# Patient Record
Sex: FEMALE | Race: WHITE | NOT HISPANIC OR LATINO
[De-identification: names, ages, dates, MRNs, and addresses within clinical notes are randomized per-mention and may not be internally consistent; named-entity substitution may affect disease eponyms.]

---

## 2017-06-23 ENCOUNTER — TRANSCRIPTION ENCOUNTER (OUTPATIENT)
Age: 52
End: 2017-06-23

## 2019-08-13 ENCOUNTER — TRANSCRIPTION ENCOUNTER (OUTPATIENT)
Age: 54
End: 2019-08-13

## 2023-01-19 ENCOUNTER — NON-APPOINTMENT (OUTPATIENT)
Age: 58
End: 2023-01-19

## 2023-01-20 ENCOUNTER — INPATIENT (INPATIENT)
Facility: HOSPITAL | Age: 58
LOS: 2 days | Discharge: HOME HEALTH | DRG: 872 | End: 2023-01-23
Attending: UROLOGY | Admitting: UROLOGY
Payer: COMMERCIAL

## 2023-01-20 VITALS
SYSTOLIC BLOOD PRESSURE: 171 MMHG | HEIGHT: 67 IN | HEART RATE: 126 BPM | WEIGHT: 199.08 LBS | DIASTOLIC BLOOD PRESSURE: 93 MMHG | OXYGEN SATURATION: 94 % | TEMPERATURE: 103 F | RESPIRATION RATE: 20 BRPM

## 2023-01-20 DIAGNOSIS — N12 TUBULO-INTERSTITIAL NEPHRITIS, NOT SPECIFIED AS ACUTE OR CHRONIC: ICD-10-CM

## 2023-01-20 DIAGNOSIS — N20.1 CALCULUS OF URETER: ICD-10-CM

## 2023-01-20 DIAGNOSIS — A41.9 SEPSIS, UNSPECIFIED ORGANISM: ICD-10-CM

## 2023-01-20 LAB
ALBUMIN SERPL ELPH-MCNC: 3.2 G/DL — LOW (ref 3.3–5)
ALP SERPL-CCNC: 375 U/L — HIGH (ref 40–120)
ALT FLD-CCNC: 31 U/L — SIGNIFICANT CHANGE UP (ref 10–45)
ANION GAP SERPL CALC-SCNC: 16 MMOL/L — SIGNIFICANT CHANGE UP (ref 5–17)
APPEARANCE UR: CLEAR — SIGNIFICANT CHANGE UP
APTT BLD: 30 SEC — SIGNIFICANT CHANGE UP (ref 27.5–35.5)
AST SERPL-CCNC: 31 U/L — SIGNIFICANT CHANGE UP (ref 10–40)
BASOPHILS # BLD AUTO: 0.03 K/UL — SIGNIFICANT CHANGE UP (ref 0–0.2)
BASOPHILS NFR BLD AUTO: 0.2 % — SIGNIFICANT CHANGE UP (ref 0–2)
BILIRUB SERPL-MCNC: 0.6 MG/DL — SIGNIFICANT CHANGE UP (ref 0.2–1.2)
BILIRUB UR-MCNC: NEGATIVE — SIGNIFICANT CHANGE UP
BLD GP AB SCN SERPL QL: NEGATIVE — SIGNIFICANT CHANGE UP
BUN SERPL-MCNC: 21 MG/DL — SIGNIFICANT CHANGE UP (ref 7–23)
CALCIUM SERPL-MCNC: 10.1 MG/DL — SIGNIFICANT CHANGE UP (ref 8.4–10.5)
CHLORIDE SERPL-SCNC: 99 MMOL/L — SIGNIFICANT CHANGE UP (ref 96–108)
CO2 SERPL-SCNC: 19 MMOL/L — LOW (ref 22–31)
COLOR SPEC: YELLOW — SIGNIFICANT CHANGE UP
CREAT SERPL-MCNC: 1.13 MG/DL — SIGNIFICANT CHANGE UP (ref 0.5–1.3)
DIFF PNL FLD: ABNORMAL
EGFR: 57 ML/MIN/1.73M2 — LOW
EOSINOPHIL # BLD AUTO: 0 K/UL — SIGNIFICANT CHANGE UP (ref 0–0.5)
EOSINOPHIL NFR BLD AUTO: 0 % — SIGNIFICANT CHANGE UP (ref 0–6)
GAS PNL BLDV: SIGNIFICANT CHANGE UP
GLUCOSE SERPL-MCNC: 132 MG/DL — HIGH (ref 70–99)
GLUCOSE UR QL: NEGATIVE — SIGNIFICANT CHANGE UP
HCG SERPL-ACNC: 3.8 MIU/ML — SIGNIFICANT CHANGE UP
HCT VFR BLD CALC: 31.7 % — LOW (ref 34.5–45)
HGB BLD-MCNC: 10.2 G/DL — LOW (ref 11.5–15.5)
IMM GRANULOCYTES NFR BLD AUTO: 1.1 % — HIGH (ref 0–0.9)
INR BLD: 1.34 RATIO — HIGH (ref 0.88–1.16)
KETONES UR-MCNC: NEGATIVE — SIGNIFICANT CHANGE UP
LEUKOCYTE ESTERASE UR-ACNC: NEGATIVE — SIGNIFICANT CHANGE UP
LYMPHOCYTES # BLD AUTO: 0.4 K/UL — LOW (ref 1–3.3)
LYMPHOCYTES # BLD AUTO: 3.2 % — LOW (ref 13–44)
MCHC RBC-ENTMCNC: 28.4 PG — SIGNIFICANT CHANGE UP (ref 27–34)
MCHC RBC-ENTMCNC: 32.2 GM/DL — SIGNIFICANT CHANGE UP (ref 32–36)
MCV RBC AUTO: 88.3 FL — SIGNIFICANT CHANGE UP (ref 80–100)
MONOCYTES # BLD AUTO: 0.11 K/UL — SIGNIFICANT CHANGE UP (ref 0–0.9)
MONOCYTES NFR BLD AUTO: 0.9 % — LOW (ref 2–14)
NEUTROPHILS # BLD AUTO: 11.8 K/UL — HIGH (ref 1.8–7.4)
NEUTROPHILS NFR BLD AUTO: 94.6 % — HIGH (ref 43–77)
NITRITE UR-MCNC: POSITIVE
NRBC # BLD: 0 /100 WBCS — SIGNIFICANT CHANGE UP (ref 0–0)
PH UR: 6 — SIGNIFICANT CHANGE UP (ref 5–8)
PLATELET # BLD AUTO: 176 K/UL — SIGNIFICANT CHANGE UP (ref 150–400)
POTASSIUM SERPL-MCNC: 4.8 MMOL/L — SIGNIFICANT CHANGE UP (ref 3.5–5.3)
POTASSIUM SERPL-SCNC: 4.8 MMOL/L — SIGNIFICANT CHANGE UP (ref 3.5–5.3)
PROT SERPL-MCNC: 7.1 G/DL — SIGNIFICANT CHANGE UP (ref 6–8.3)
PROT UR-MCNC: ABNORMAL
PROTHROM AB SERPL-ACNC: 15.4 SEC — HIGH (ref 10.5–13.4)
RAPID RVP RESULT: SIGNIFICANT CHANGE UP
RBC # BLD: 3.59 M/UL — LOW (ref 3.8–5.2)
RBC # FLD: 14.7 % — HIGH (ref 10.3–14.5)
RH IG SCN BLD-IMP: POSITIVE — SIGNIFICANT CHANGE UP
SARS-COV-2 RNA SPEC QL NAA+PROBE: SIGNIFICANT CHANGE UP
SODIUM SERPL-SCNC: 134 MMOL/L — LOW (ref 135–145)
SP GR SPEC: 1.04 — SIGNIFICANT CHANGE UP (ref 1.01–1.02)
TROPONIN T, HIGH SENSITIVITY RESULT: 12 NG/L — SIGNIFICANT CHANGE UP (ref 0–51)
UROBILINOGEN FLD QL: SIGNIFICANT CHANGE UP
WBC # BLD: 12.48 K/UL — HIGH (ref 3.8–10.5)
WBC # FLD AUTO: 12.48 K/UL — HIGH (ref 3.8–10.5)

## 2023-01-20 PROCEDURE — 74177 CT ABD & PELVIS W/CONTRAST: CPT | Mod: 26,MA

## 2023-01-20 PROCEDURE — 50432 PLMT NEPHROSTOMY CATHETER: CPT | Mod: RT

## 2023-01-20 PROCEDURE — 71045 X-RAY EXAM CHEST 1 VIEW: CPT | Mod: 26

## 2023-01-20 PROCEDURE — 99223 1ST HOSP IP/OBS HIGH 75: CPT

## 2023-01-20 PROCEDURE — 99291 CRITICAL CARE FIRST HOUR: CPT

## 2023-01-20 RX ORDER — ONDANSETRON 8 MG/1
4 TABLET, FILM COATED ORAL ONCE
Refills: 0 | Status: DISCONTINUED | OUTPATIENT
Start: 2023-01-20 | End: 2023-01-21

## 2023-01-20 RX ORDER — ACETAMINOPHEN 500 MG
975 TABLET ORAL EVERY 6 HOURS
Refills: 0 | Status: DISCONTINUED | OUTPATIENT
Start: 2023-01-20 | End: 2023-01-23

## 2023-01-20 RX ORDER — SODIUM CHLORIDE 9 MG/ML
1000 INJECTION, SOLUTION INTRAVENOUS
Refills: 0 | Status: DISCONTINUED | OUTPATIENT
Start: 2023-01-20 | End: 2023-01-21

## 2023-01-20 RX ORDER — HYDROMORPHONE HYDROCHLORIDE 2 MG/ML
0.5 INJECTION INTRAMUSCULAR; INTRAVENOUS; SUBCUTANEOUS
Refills: 0 | Status: DISCONTINUED | OUTPATIENT
Start: 2023-01-20 | End: 2023-01-21

## 2023-01-20 RX ORDER — OXYCODONE HYDROCHLORIDE 5 MG/1
5 TABLET ORAL ONCE
Refills: 0 | Status: DISCONTINUED | OUTPATIENT
Start: 2023-01-20 | End: 2023-01-21

## 2023-01-20 RX ORDER — HEPARIN SODIUM 5000 [USP'U]/ML
5000 INJECTION INTRAVENOUS; SUBCUTANEOUS EVERY 8 HOURS
Refills: 0 | Status: DISCONTINUED | OUTPATIENT
Start: 2023-01-20 | End: 2023-01-23

## 2023-01-20 RX ORDER — ACETAMINOPHEN 500 MG
975 TABLET ORAL ONCE
Refills: 0 | Status: COMPLETED | OUTPATIENT
Start: 2023-01-20 | End: 2023-01-20

## 2023-01-20 RX ORDER — CEFTRIAXONE 500 MG/1
1000 INJECTION, POWDER, FOR SOLUTION INTRAMUSCULAR; INTRAVENOUS ONCE
Refills: 0 | Status: COMPLETED | OUTPATIENT
Start: 2023-01-20 | End: 2023-01-20

## 2023-01-20 RX ORDER — SODIUM CHLORIDE 9 MG/ML
2800 INJECTION INTRAMUSCULAR; INTRAVENOUS; SUBCUTANEOUS ONCE
Refills: 0 | Status: COMPLETED | OUTPATIENT
Start: 2023-01-20 | End: 2023-01-20

## 2023-01-20 RX ORDER — HYDROMORPHONE HYDROCHLORIDE 2 MG/ML
0.25 INJECTION INTRAMUSCULAR; INTRAVENOUS; SUBCUTANEOUS
Refills: 0 | Status: DISCONTINUED | OUTPATIENT
Start: 2023-01-20 | End: 2023-01-21

## 2023-01-20 RX ORDER — OXYCODONE AND ACETAMINOPHEN 5; 325 MG/1; MG/1
2 TABLET ORAL EVERY 6 HOURS
Refills: 0 | Status: DISCONTINUED | OUTPATIENT
Start: 2023-01-20 | End: 2023-01-23

## 2023-01-20 RX ADMIN — CEFTRIAXONE 100 MILLIGRAM(S): 500 INJECTION, POWDER, FOR SOLUTION INTRAMUSCULAR; INTRAVENOUS at 18:42

## 2023-01-20 RX ADMIN — SODIUM CHLORIDE 2800 MILLILITER(S): 9 INJECTION INTRAMUSCULAR; INTRAVENOUS; SUBCUTANEOUS at 17:59

## 2023-01-20 RX ADMIN — SODIUM CHLORIDE 125 MILLILITER(S): 9 INJECTION, SOLUTION INTRAVENOUS at 23:54

## 2023-01-20 RX ADMIN — Medication 975 MILLIGRAM(S): at 17:59

## 2023-01-20 NOTE — H&P ADULT - NSHPPHYSICALEXAM_GEN_ALL_CORE
Physical Exam  Gen: NAD  HEENT: normocephalic, atraumatic, no scleral icterus  Pulm: CTA b/l, No respiratory distress, no subcostal retractions, no accessory muscle use   CV: S1 S2, RRR,  no JVD  Abd: Soft, NT, ND, no rebound tenderness or guarding  MSK:  mild R CVAT, Moving all extremities, full ROM in all extremities, No edema present  NEURO: A&Ox3, no focal neurological deficits, CN 2-12 grossly intact  SKIN: warm, dry, no rash.

## 2023-01-20 NOTE — PRE-ANESTHESIA EVALUATION ADULT - NSANTHOSAYNRD_GEN_A_CORE
No. KAYLEY screening performed.  STOP BANG Legend: 0-2 = LOW Risk; 3-4 = INTERMEDIATE Risk; 5-8 = HIGH Risk

## 2023-01-20 NOTE — PROCEDURE NOTE - NSPROCNAME_GEN_A_CORE
Signed up to see pt when shift started at 2. Pt had already eloped. Did not see pt.     Barry Renae,   01/03/18 8652     Interventional Radiology

## 2023-01-20 NOTE — PROCEDURE NOTE - PROCEDURE FINDINGS AND DETAILS
Status post successful placement of a right 8.5Fr nephrostomy tube. Approximately 100cc of purulent green tinged fluid/pus was aspirated during the procedure. Specimen sent for gram-stain and culture.  Drainage catheter left to gravity with drainage bag and sterile dressing applied.

## 2023-01-20 NOTE — ED PROVIDER NOTE - CARE PLAN
1 Principal Discharge DX:	Pyelonephritis  Secondary Diagnosis:	Stone, kidney  Secondary Diagnosis:	Severe sepsis

## 2023-01-20 NOTE — ED PROVIDER NOTE - CRITICAL CARE ATTENDING CONTRIBUTION TO CARE
Attending Statement: This was a shared visit with the BIBI. I reviewed and verified the documentation and independently performed the documented:     Medical Decision Making.    This is a 57-year-old female coming in with 10 days of fever and chills.  She is also got right-sided pain.  I reviewed the CT scan and there is a large stone in the proximal ureter with associated hydronephrosis.  I discussed the case with urology and they have come to see the patient.  I expect the patient's, required to go to the operating room placed nephrostomy tube.  IV fluid and antibiotics administered.

## 2023-01-20 NOTE — ED ADULT NURSE REASSESSMENT NOTE - NS ED NURSE REASSESS COMMENT FT1
Report received from KAYY Edwards. Pt A&Ox4. Does not appear to be in any acute distress. Pt reports generalized malaise. Urology at bedside. Pt admitted to surgery. Pending bed assignment. VS documented.

## 2023-01-20 NOTE — CONSULT NOTE ADULT - ASSESSMENT
-----------------------------------------------------------  Interventional Radiology Brief Consult Note  -----------------------------------------------------------    Reason for Referral: Right percutaneous nephrostomy tube placement     Clinical Summary: 57y Female with no significant pmh presents with complaint of fever x 10 days found to have an obstructing right UPJ/proximal ureter 1.6 cm stone with moderate/severe hydronephrosis, fevers and tachycardia. IR is consulted for right percutaneous nephrostomy tube placement.     Vitals:  T(F): 98.9 (23 @ 19:12), Max: 102.7 (23 @ 16:25)  HR: 79 (23 @ 19:12) (79 - 126)  BP: 113/56 (23 @ 19:12) (113/56 - 171/93)  RR: 17 (23 @ 19:12) (17 - 20)  SpO2: 95% (23 @ 19:12) (94% - 96%)    Labs:           10.2  12.48)-----(176     (23 @ 17:08)         31.7     134 | 99 | 21  --------------------< 132     (23 @ 17:08)  4.8 | 19 | 1.13       PT: 15.4<H> 23 @ 17:08  aPTT: 30.0 23 @ 17:08   INR: 1.34<H> 23 @ 17:08    Imagin.6 cm obstructing right proximal ureteral stone with moderate to severe hydronephrosis.     Assessment: 57y Female with an obstructing right proximal ureteral stone and moderate to severe hydronephrosis with 10 days of fevers found to be tachycardic and febrile on presentation.     Recommendations:  - Plan for right nephrostomy tube placement today. Given patient last ate/drank at 2pm, will proceed with procedure 8 hours after. Keep NPO.   - Please place IR procedure order under Dr. Bain.   - Updated cbc, bmp, coags and covid swab.        -----------------------------------------------------------  Interventional Radiology Brief Consult Note  -----------------------------------------------------------    Reason for Referral: Right percutaneous nephrostomy tube placement     Clinical Summary: 57y Female with no significant pmh presents with complaint of fever x 10 days found to have an obstructing right proximal ureter 1.6 cm stone with moderate/severe hydronephrosis, fevers and tachycardia. IR is consulted for right percutaneous nephrostomy tube placement.     Vitals:  T(F): 98.9 (23 @ 19:12), Max: 102.7 (23 @ 16:25)  HR: 79 (23 @ 19:12) (79 - 126)  BP: 113/56 (23 @ 19:12) (113/56 - 171/93)  RR: 17 (23 @ 19:12) (17 - 20)  SpO2: 95% (23 @ 19:12) (94% - 96%)    Labs:           10.2  12.48)-----(176     (23 @ 17:08)         31.7     134 | 99 | 21  --------------------< 132     (23 @ 17:08)  4.8 | 19 | 1.13       PT: 15.4<H> 23 @ 17:08  aPTT: 30.0 23 17:08   INR: 1.34<H> 23 @ 17:08    Imagin.6 cm obstructing right proximal ureteral stone with moderate to severe hydronephrosis.     Assessment: 57y Female with an obstructing right proximal ureteral stone and moderate to severe hydronephrosis with 10 days of fevers found to be tachycardic and febrile on presentation.     Recommendations:  - Plan for right nephrostomy tube placement today. Given patient last ate/drank at 2pm, will proceed with procedure 8 hours after. Keep NPO.   - Please place IR procedure order under Dr. Bain.   - Updated cbc, bmp, coags and covid swab.

## 2023-01-20 NOTE — H&P ADULT - PROBLEM SELECTOR PLAN 1
-Urgent IR consult for perc right nephrostomy tube   -Keep NPO    -Continue broad spectrum abx  -Follow up urine and blood cultures  -Monitor vitals and I&Os  -IV fluid hydration

## 2023-01-20 NOTE — ED ADULT NURSE NOTE - OBJECTIVE STATEMENT
58 y/o F A&Ox3 denies PMH/PSH presents to the ED from home c/o fever. Pt reports intermittent fevers and chills x10 days. Pt states she feels like her entire body is rigoring. Pt endorses right sided back pain and right sided abdominal pain. Upon ED arrival pt is well appearing. Breathing is even and unlabored. Skin is warm, dry & in tact. EKG completed at bedside, placed on CM- sinus tachycardia 110s. Pt febrile, 101.1 orally. Pt denies CP, SOB, dizziness, N/V/D, numbness, tingling, cough, urinary s/s. IV access obtained. Call bell within reach, comfort & safety provided. 56 y/o F A&Ox3 denies PMH/PSH presents to the ED from home c/o fever. Pt reports intermittent fevers and chills x10 days. Pt states she feels like her entire body is rigoring. Pt endorses right sided back pain and right sided abdominal pain. Upon ED arrival pt is well appearing. Breathing is even and unlabored. Skin is warm, dry & in tact. EKG completed at bedside, placed on CM- sinus tachycardia 110s. Pt febrile, 101.1 orally. Pt endorses slight right sided CVA tenderness on palpation. Pt denies CP, SOB, dizziness, N/V/D, numbness, tingling, cough, urinary s/s. IV access obtained. Call bell within reach, comfort & safety provided.

## 2023-01-20 NOTE — H&P ADULT - ATTENDING COMMENTS
CT imaging reviewed.  Large obstructing stone at the right UPJ/proximal ureter.  Sepsis secondary to UTI secondary to obstruction stone.  Emergent IR NT placement.

## 2023-01-20 NOTE — H&P ADULT - HISTORY OF PRESENT ILLNESS
56y/o female with no significant PMHx presents to ED c/o fever and rigors x 10 days. Admits to general malaise and dull nonradiating right flank pain. She reports urinary frequency but no dysuria, urgency, gross hematuria or abdominal pain. Pt reports she was unable to seek medical attention sooner due to family matters but rigors and weakness worsened today.

## 2023-01-20 NOTE — ED PROVIDER NOTE - OBJECTIVE STATEMENT
57-year-old female no significant past medical history coming in with complaint of fever x10 days..  Patient states that she had a busy week so was not able to attend to it and come in sooner but she has been having) type of chest pain over the last 10 days that is very very painful like, positive malaise positive rhinovirus positive right lower quadrant abdominal pain and mild/discomfort increased thirst, right side mild back pain. never had in past. +urinary frequency but does state she normally urinates a lot. no dysuria.  Patient 57-year-old female no significant past medical history coming in with complaint of fever x10 days..  Patient states that she had a busy week so was not able to attend to it and come in sooner but she has been having rigor chills with fevers intermittent for  over the last 10 days that is very painful, +malaise positive rhinorrhea, positive right lower quadrant abdominal pain that is mild/discomfort, increased thirst, right side mild back pain. never had in past. +urinary frequency but does state she normally urinates a lot. no dysuria.    denies dysuria, denies cough, sore throat, chest pain

## 2023-01-20 NOTE — PATIENT PROFILE ADULT - FALL HARM RISK - UNIVERSAL INTERVENTIONS
Bed in lowest position, wheels locked, appropriate side rails in place/Call bell, personal items and telephone in reach/Instruct patient to call for assistance before getting out of bed or chair/Non-slip footwear when patient is out of bed/[place] to call system/Physically safe environment - no spills, clutter or unnecessary equipment/Purposeful Proactive Rounding/Room/bathroom lighting operational, light cord in reach

## 2023-01-20 NOTE — PRE-ANESTHESIA EVALUATION ADULT - NSANTHPMHFT_GEN_ALL_CORE
56 y/o female, with no significant PMH; c/o fever, rigor x 10 days; found to have right hydronephrosis and nephrolithiasis; WBC 12.48, creat 1.13; lactate 2.9

## 2023-01-20 NOTE — H&P ADULT - NSHPLABSRESULTS_GEN_ALL_CORE
10.2   12.48 )-----------( 176      ( 20 Jan 2023 17:08 )             31.7     01-20    134<L>  |  99  |  21  ----------------------------<  132<H>  4.8   |  19<L>  |  1.13    Ca    10.1      20 Jan 2023 17:08    TPro  7.1  /  Alb  3.2<L>  /  TBili  0.6  /  DBili  x   /  AST  31  /  ALT  31  /  AlkPhos  375<H>  01-20    < from: CT Abdomen and Pelvis w/ IV Cont (01.20.23 @ 17:53) >    IMPRESSION:  1.6 cm obstructing right proximal ureteral calculus with resultant   moderate to severe hydronephrosis.    Right perinephric and periureteral inflammation with multiple right   hypodense intrarenal collections closely associated with the collecting   system, suggestive of possible superimposed pyonephrosis/pyelonephritis.   Recommend correlation with urinalysis and serum laboratory findings.    < end of copied text >

## 2023-01-20 NOTE — H&P ADULT - ASSESSMENT
56y/o female with sepsis secondary to a 1.6cm right proximal stone with associated moderate to severe hydronephrosis

## 2023-01-21 LAB
ANION GAP SERPL CALC-SCNC: 11 MMOL/L — SIGNIFICANT CHANGE UP (ref 5–17)
BUN SERPL-MCNC: 18 MG/DL — SIGNIFICANT CHANGE UP (ref 7–23)
CALCIUM SERPL-MCNC: 8.9 MG/DL — SIGNIFICANT CHANGE UP (ref 8.4–10.5)
CHLORIDE SERPL-SCNC: 105 MMOL/L — SIGNIFICANT CHANGE UP (ref 96–108)
CO2 SERPL-SCNC: 21 MMOL/L — LOW (ref 22–31)
CREAT SERPL-MCNC: 0.99 MG/DL — SIGNIFICANT CHANGE UP (ref 0.5–1.3)
E COLI DNA BLD POS QL NAA+NON-PROBE: SIGNIFICANT CHANGE UP
EGFR: 67 ML/MIN/1.73M2 — SIGNIFICANT CHANGE UP
GLUCOSE SERPL-MCNC: 111 MG/DL — HIGH (ref 70–99)
GRAM STN FLD: SIGNIFICANT CHANGE UP
HCT VFR BLD CALC: 25.9 % — LOW (ref 34.5–45)
HGB BLD-MCNC: 8.2 G/DL — LOW (ref 11.5–15.5)
MCHC RBC-ENTMCNC: 28.3 PG — SIGNIFICANT CHANGE UP (ref 27–34)
MCHC RBC-ENTMCNC: 31.7 GM/DL — LOW (ref 32–36)
MCV RBC AUTO: 89.3 FL — SIGNIFICANT CHANGE UP (ref 80–100)
METHOD TYPE: SIGNIFICANT CHANGE UP
NRBC # BLD: 0 /100 WBCS — SIGNIFICANT CHANGE UP (ref 0–0)
PLATELET # BLD AUTO: 116 K/UL — LOW (ref 150–400)
POTASSIUM SERPL-MCNC: 4.4 MMOL/L — SIGNIFICANT CHANGE UP (ref 3.5–5.3)
POTASSIUM SERPL-SCNC: 4.4 MMOL/L — SIGNIFICANT CHANGE UP (ref 3.5–5.3)
RBC # BLD: 2.9 M/UL — LOW (ref 3.8–5.2)
RBC # FLD: 15 % — HIGH (ref 10.3–14.5)
SODIUM SERPL-SCNC: 137 MMOL/L — SIGNIFICANT CHANGE UP (ref 135–145)
SPECIMEN SOURCE: SIGNIFICANT CHANGE UP
SPECIMEN SOURCE: SIGNIFICANT CHANGE UP
WBC # BLD: 10.91 K/UL — HIGH (ref 3.8–10.5)
WBC # FLD AUTO: 10.91 K/UL — HIGH (ref 3.8–10.5)

## 2023-01-21 PROCEDURE — 99231 SBSQ HOSP IP/OBS SF/LOW 25: CPT

## 2023-01-21 RX ORDER — PIPERACILLIN AND TAZOBACTAM 4; .5 G/20ML; G/20ML
3.38 INJECTION, POWDER, LYOPHILIZED, FOR SOLUTION INTRAVENOUS EVERY 8 HOURS
Refills: 0 | Status: DISCONTINUED | OUTPATIENT
Start: 2023-01-21 | End: 2023-01-23

## 2023-01-21 RX ORDER — PIPERACILLIN AND TAZOBACTAM 4; .5 G/20ML; G/20ML
3.38 INJECTION, POWDER, LYOPHILIZED, FOR SOLUTION INTRAVENOUS ONCE
Refills: 0 | Status: COMPLETED | OUTPATIENT
Start: 2023-01-21 | End: 2023-01-21

## 2023-01-21 RX ORDER — SENNA PLUS 8.6 MG/1
2 TABLET ORAL AT BEDTIME
Refills: 0 | Status: DISCONTINUED | OUTPATIENT
Start: 2023-01-21 | End: 2023-01-23

## 2023-01-21 RX ORDER — SODIUM CHLORIDE 9 MG/ML
1000 INJECTION, SOLUTION INTRAVENOUS
Refills: 0 | Status: DISCONTINUED | OUTPATIENT
Start: 2023-01-21 | End: 2023-01-23

## 2023-01-21 RX ADMIN — OXYCODONE HYDROCHLORIDE 5 MILLIGRAM(S): 5 TABLET ORAL at 00:57

## 2023-01-21 RX ADMIN — PIPERACILLIN AND TAZOBACTAM 25 GRAM(S): 4; .5 INJECTION, POWDER, LYOPHILIZED, FOR SOLUTION INTRAVENOUS at 09:13

## 2023-01-21 RX ADMIN — PIPERACILLIN AND TAZOBACTAM 25 GRAM(S): 4; .5 INJECTION, POWDER, LYOPHILIZED, FOR SOLUTION INTRAVENOUS at 22:14

## 2023-01-21 RX ADMIN — OXYCODONE HYDROCHLORIDE 5 MILLIGRAM(S): 5 TABLET ORAL at 01:50

## 2023-01-21 RX ADMIN — Medication 975 MILLIGRAM(S): at 20:39

## 2023-01-21 RX ADMIN — Medication 975 MILLIGRAM(S): at 13:41

## 2023-01-21 RX ADMIN — SODIUM CHLORIDE 125 MILLILITER(S): 9 INJECTION, SOLUTION INTRAVENOUS at 05:52

## 2023-01-21 RX ADMIN — HEPARIN SODIUM 5000 UNIT(S): 5000 INJECTION INTRAVENOUS; SUBCUTANEOUS at 22:14

## 2023-01-21 RX ADMIN — SENNA PLUS 2 TABLET(S): 8.6 TABLET ORAL at 22:45

## 2023-01-21 RX ADMIN — HEPARIN SODIUM 5000 UNIT(S): 5000 INJECTION INTRAVENOUS; SUBCUTANEOUS at 13:42

## 2023-01-21 RX ADMIN — Medication 975 MILLIGRAM(S): at 14:11

## 2023-01-21 RX ADMIN — PIPERACILLIN AND TAZOBACTAM 25 GRAM(S): 4; .5 INJECTION, POWDER, LYOPHILIZED, FOR SOLUTION INTRAVENOUS at 15:03

## 2023-01-21 RX ADMIN — PIPERACILLIN AND TAZOBACTAM 200 GRAM(S): 4; .5 INJECTION, POWDER, LYOPHILIZED, FOR SOLUTION INTRAVENOUS at 05:51

## 2023-01-21 RX ADMIN — HEPARIN SODIUM 5000 UNIT(S): 5000 INJECTION INTRAVENOUS; SUBCUTANEOUS at 05:51

## 2023-01-21 RX ADMIN — Medication 975 MILLIGRAM(S): at 21:15

## 2023-01-21 NOTE — PROGRESS NOTE ADULT - SUBJECTIVE AND OBJECTIVE BOX
UROLOGY PA PROGRESS NOTE:     Subjective:  no acute events overnight, s/p right NT last night, no acute events since.     Objective:  Vital signs  T(F): , Max: 102.7 (23 @ 16:25)  HR: 64 (23 @ 05:12)  BP: 103/64 (23 @ 05:12)  SpO2: 95% (23 @ 08:16)  Wt(kg): --    Output      @ 07:  -   @ 07:00  --------------------------------------------------------  IN: 875 mL / OUT: 100 mL / NET: 775 mL     @ 07:01  -   @ 09:26  --------------------------------------------------------  IN: 0 mL / OUT: 500 mL / NET: -500 mL    NT 100cc overnight     Physical Exam:  Gen: NAD  Abd: soft, NT/ND, +R NT draining ligh tpink urine   : voiding     Labs:    10.91 / 25.9  /x        x     / x     /0.99                           8.2    10. )-----------( 116      ( 2023 07:06 )             25.9         137  |  105  |  18  ----------------------------<  111<H>  4.4   |  21<L>  |  0.99    Ca    8.9      2023 07:05    TPro  7.1  /  Alb  3.2<L>  /  TBili  0.6  /  DBili  x   /  AST  31  /  ALT  31  /  AlkPhos  375<H>      PT/INR - ( 2023 17:08 )   PT: 15.4 sec;   INR: 1.34 ratio         PTT - ( 2023 17:08 )  PTT:30.0 sec  Urinalysis Basic - ( 2023 18:51 )    Color: Yellow / Appearance: Clear / S.043 / pH: x  Gluc: x / Ketone: Negative  / Bili: Negative / Urobili: <2 mg/dL   Blood: x / Protein: 100 mg/dL / Nitrite: Positive   Leuk Esterase: Negative / RBC: See Note /hpf / WBC See Note   Sq Epi: x / Non Sq Epi: x / Bacteria: x        Urine Cx:    Culture - Blood (collected 2023 17:15)  Source: .Blood Blood  Gram Stain (2023 08:31):    Growth in anaerobic bottle: Gram Negative Rods    Growth in aerobic bottle: Gram Negative Rods  Preliminary Report (2023 08:31):    Growth in anaerobic bottle: Gram Negative Rods    Growth in aerobic bottle: Gram Negative Rods    ***Blood Panel PCR results on this specimen are available    approximately 3 hours after the Gram stain result.***    Gram stain, PCR, and/or culture results may not always    correspond due to difference in methodologies.    ************************************************************    This PCR assay was performed by multiplex PCR. This    Assay tests for 66 bacterial and resistance gene targets.    Please refer to the [facility] test directory    at [email or web address] for details.  Organism: Blood Culture PCR (2023 07:09)  Organism: Blood Culture PCR (2023 07:09)

## 2023-01-21 NOTE — PROGRESS NOTE ADULT - SUBJECTIVE AND OBJECTIVE BOX
-------------------------------------------------------  Interventional Radiology Follow-Up Note  -------------------------------------------------------    Procedure: Right nephrostomy tube placement     57 y.o. F with sepsis secondary to 1.6 cm obstructing right proximal ureter stone with moderate/severe hydronephrosis s/p right nephrostomy tube placement on 1/20/23.   Patient was seen bedside with no complaints. No significant events over night.     Vital Signs:    T(C): 36.4 (01-21-23 @ 08:16), Max: 39.3 (01-20-23 @ 16:25)  HR: 64 (01-21-23 @ 05:12) (62 - 126)  BP: 103/64 (01-21-23 @ 05:12) (96/55 - 171/93)  RR: 18 (01-21-23 @ 08:16) (14 - 20)  SpO2: 95% (01-21-23 @ 08:16) (94% - 96%)    Labs:           8.2  10.91)-----(116     (01-21-23 @ 07:06)         25.9     137 | 105 | 18  --------------------< 111     (01-21-23 @ 07:05)  4.4 | 21 | 0.99       PT: 15.4<H> 01-20-23 @ 17:08  aPTT: 30.0 01-20-23 @ 17:08   INR: 1.34<H> 01-20-23 @ 17:08      Physical Exam:     General: No acute distress.   Cardiovascular: No tachycardia  Respiratory: Breathing comfortably.   Abdomen: Non-distended. No tenderness to palpation.   Extremities: No edema. No groin hematoma. Pulses 2+     Impression:  57 y.o. F with sepsis secondary to an obstructing 1.6 cm proximal right ureter stone with moderate/severe hydronephrosis now s/p right nephrostomy tube placement on 1/20/23.    Recommendations:  - Monitor for signs of sepsis and bleeding.   - Continue with drainage bag.   - Continue with abx.   - Rest of care per primary service.     --  Rupal Srinivasan MD   Interventional Radiology Resident (PGY-4)  Available on [employer] TEAMS    For EMERGENT inquiries/questions:  IR Pager ([facility]): [phone]  IR Pager ([facility]): [phone] ; [phone]    For non-emergent consults/questions:   Please place a sunrise order "Consult- Interventional Radiology" with an appropriate callback number    For questions about scheduling during appropriate work hours, call IR :  [facility]: [phone]  [facility]: [phone]    For outpatient IR booking:  [facility]: [phone]  [facility]: [phone]   -------------------------------------------------------  Interventional Radiology Follow-Up Note  -------------------------------------------------------    Procedure: Right nephrostomy tube placement     57 y.o. F with sepsis secondary to 1.6 cm obstructing right proximal ureter stone with moderate/severe hydronephrosis s/p right nephrostomy tube placement on 1/20/23.   Patient was seen bedside with no complaints. No significant events over night.     Vital Signs:    T(C): 36.4 (01-21-23 @ 08:16), Max: 39.3 (01-20-23 @ 16:25)  HR: 64 (01-21-23 @ 05:12) (62 - 126)  BP: 103/64 (01-21-23 @ 05:12) (96/55 - 171/93)  RR: 18 (01-21-23 @ 08:16) (14 - 20)  SpO2: 95% (01-21-23 @ 08:16) (94% - 96%)    Labs:           8.2  10.91)-----(116     (01-21-23 @ 07:06)         25.9     137 | 105 | 18  --------------------< 111     (01-21-23 @ 07:05)  4.4 | 21 | 0.99       PT: 15.4<H> 01-20-23 @ 17:08  aPTT: 30.0 01-20-23 @ 17:08   INR: 1.34<H> 01-20-23 @ 17:08      Physical Exam:     General: No acute distress.   Respiratory: Breathing comfortably.   Abdomen: Non-distended. No tenderness to palpation. Right nephrostomy tube was just emptied with 5cc blood tinged urine in the drainage bag.     Impression:  57 y.o. F with sepsis secondary to an obstructing 1.6 cm proximal right ureter stone with moderate/severe hydronephrosis now s/p right nephrostomy tube placement on 1/20/23.    Recommendations:  - Monitor for signs of sepsis and bleeding.   - Continue with monitoring drainage output.  - Continue with abx.   - Rest of care per primary service.     --  Rupal Srinivasan MD   Interventional Radiology Resident (PGY-4)  Available on [employer] TEAMS    For EMERGENT inquiries/questions:  IR Pager ([facility]): [phone]  IR Pager ([facility]): [phone] ; [phone]    For non-emergent consults/questions:   Please place a sunrise order "Consult- Interventional Radiology" with an appropriate callback number    For questions about scheduling during appropriate work hours, call IR :  [facility]: [phone]  [facility]: [phone]    For outpatient IR booking:  [facility]: [phone]  [facility]: [phone]

## 2023-01-22 LAB
-  AMIKACIN: SIGNIFICANT CHANGE UP
-  AMPICILLIN/SULBACTAM: SIGNIFICANT CHANGE UP
-  AMPICILLIN: SIGNIFICANT CHANGE UP
-  AZTREONAM: SIGNIFICANT CHANGE UP
-  CEFAZOLIN: SIGNIFICANT CHANGE UP
-  CEFEPIME: SIGNIFICANT CHANGE UP
-  CEFOXITIN: SIGNIFICANT CHANGE UP
-  CEFTRIAXONE: SIGNIFICANT CHANGE UP
-  CIPROFLOXACIN: SIGNIFICANT CHANGE UP
-  ERTAPENEM: SIGNIFICANT CHANGE UP
-  GENTAMICIN: SIGNIFICANT CHANGE UP
-  IMIPENEM: SIGNIFICANT CHANGE UP
-  LEVOFLOXACIN: SIGNIFICANT CHANGE UP
-  MEROPENEM: SIGNIFICANT CHANGE UP
-  PIPERACILLIN/TAZOBACTAM: SIGNIFICANT CHANGE UP
-  TOBRAMYCIN: SIGNIFICANT CHANGE UP
-  TRIMETHOPRIM/SULFAMETHOXAZOLE: SIGNIFICANT CHANGE UP
ANION GAP SERPL CALC-SCNC: 10 MMOL/L — SIGNIFICANT CHANGE UP (ref 5–17)
BUN SERPL-MCNC: 16 MG/DL — SIGNIFICANT CHANGE UP (ref 7–23)
CALCIUM SERPL-MCNC: 9.1 MG/DL — SIGNIFICANT CHANGE UP (ref 8.4–10.5)
CHLORIDE SERPL-SCNC: 105 MMOL/L — SIGNIFICANT CHANGE UP (ref 96–108)
CO2 SERPL-SCNC: 21 MMOL/L — LOW (ref 22–31)
CREAT SERPL-MCNC: 1.1 MG/DL — SIGNIFICANT CHANGE UP (ref 0.5–1.3)
CULTURE RESULTS: SIGNIFICANT CHANGE UP
EGFR: 59 ML/MIN/1.73M2 — LOW
GLUCOSE SERPL-MCNC: 146 MG/DL — HIGH (ref 70–99)
GRAM STN FLD: SIGNIFICANT CHANGE UP
HCT VFR BLD CALC: 25.4 % — LOW (ref 34.5–45)
HGB BLD-MCNC: 7.9 G/DL — LOW (ref 11.5–15.5)
MCHC RBC-ENTMCNC: 28 PG — SIGNIFICANT CHANGE UP (ref 27–34)
MCHC RBC-ENTMCNC: 31.1 GM/DL — LOW (ref 32–36)
MCV RBC AUTO: 90.1 FL — SIGNIFICANT CHANGE UP (ref 80–100)
METHOD TYPE: SIGNIFICANT CHANGE UP
NRBC # BLD: 0 /100 WBCS — SIGNIFICANT CHANGE UP (ref 0–0)
PLATELET # BLD AUTO: 156 K/UL — SIGNIFICANT CHANGE UP (ref 150–400)
POTASSIUM SERPL-MCNC: 4.3 MMOL/L — SIGNIFICANT CHANGE UP (ref 3.5–5.3)
POTASSIUM SERPL-SCNC: 4.3 MMOL/L — SIGNIFICANT CHANGE UP (ref 3.5–5.3)
RBC # BLD: 2.82 M/UL — LOW (ref 3.8–5.2)
RBC # FLD: 15.1 % — HIGH (ref 10.3–14.5)
SODIUM SERPL-SCNC: 136 MMOL/L — SIGNIFICANT CHANGE UP (ref 135–145)
SPECIMEN SOURCE: SIGNIFICANT CHANGE UP
WBC # BLD: 11.32 K/UL — HIGH (ref 3.8–10.5)
WBC # FLD AUTO: 11.32 K/UL — HIGH (ref 3.8–10.5)

## 2023-01-22 PROCEDURE — 99231 SBSQ HOSP IP/OBS SF/LOW 25: CPT

## 2023-01-22 RX ADMIN — SENNA PLUS 2 TABLET(S): 8.6 TABLET ORAL at 21:00

## 2023-01-22 RX ADMIN — Medication 975 MILLIGRAM(S): at 21:35

## 2023-01-22 RX ADMIN — HEPARIN SODIUM 5000 UNIT(S): 5000 INJECTION INTRAVENOUS; SUBCUTANEOUS at 21:00

## 2023-01-22 RX ADMIN — Medication 975 MILLIGRAM(S): at 21:01

## 2023-01-22 RX ADMIN — HEPARIN SODIUM 5000 UNIT(S): 5000 INJECTION INTRAVENOUS; SUBCUTANEOUS at 14:15

## 2023-01-22 RX ADMIN — PIPERACILLIN AND TAZOBACTAM 25 GRAM(S): 4; .5 INJECTION, POWDER, LYOPHILIZED, FOR SOLUTION INTRAVENOUS at 14:15

## 2023-01-22 RX ADMIN — SODIUM CHLORIDE 75 MILLILITER(S): 9 INJECTION, SOLUTION INTRAVENOUS at 17:01

## 2023-01-22 RX ADMIN — Medication 975 MILLIGRAM(S): at 03:50

## 2023-01-22 RX ADMIN — Medication 975 MILLIGRAM(S): at 10:29

## 2023-01-22 RX ADMIN — HEPARIN SODIUM 5000 UNIT(S): 5000 INJECTION INTRAVENOUS; SUBCUTANEOUS at 05:08

## 2023-01-22 RX ADMIN — PIPERACILLIN AND TAZOBACTAM 25 GRAM(S): 4; .5 INJECTION, POWDER, LYOPHILIZED, FOR SOLUTION INTRAVENOUS at 05:08

## 2023-01-22 RX ADMIN — Medication 975 MILLIGRAM(S): at 11:29

## 2023-01-22 RX ADMIN — Medication 975 MILLIGRAM(S): at 04:23

## 2023-01-22 RX ADMIN — PIPERACILLIN AND TAZOBACTAM 25 GRAM(S): 4; .5 INJECTION, POWDER, LYOPHILIZED, FOR SOLUTION INTRAVENOUS at 21:01

## 2023-01-22 NOTE — PROVIDER CONTACT NOTE (CRITICAL VALUE NOTIFICATION) - BACKGROUND
Pt s/p right percutaneous nephrostomy placement.
Pt admitted for tubulointerstitial nephritis s/p R neph tube placement
admitted for fevers, rigors, neph tube placed for hydronephrosis
pt currently admitted for fevers and R flank pain, s/p R NT placement

## 2023-01-22 NOTE — PROGRESS NOTE ADULT - SUBJECTIVE AND OBJECTIVE BOX
-------------------------------------------------------  Interventional Radiology Follow-Up Note  -------------------------------------------------------    Procedure: Right nephrostomy tube placement     57 y.o. F with sepsis secondary to 1.6 cm obstructing right proximal ureter stone with moderate/severe hydronephrosis s/p right nephrostomy tube placement on 1/20/23. No significant events over night. Reports drainage has become clear. Reports mild abdominal pain. Encouraged ambulation.     Vital Signs:    T(C): 36.4 (01-22-23 @ 09:14), Max: 36.7 (01-21-23 @ 16:33)  HR: 63 (01-22-23 @ 09:14) (57 - 68)  BP: 129/76 (01-22-23 @ 09:14) (116/70 - 129/76)  RR: 18 (01-22-23 @ 09:14) (18 - 18)  SpO2: 95% (01-22-23 @ 09:14) (95% - 97%)    Labs:           7.9  11.32)-----(156     (01-22-23 @ 07:27)         25.4     136 | 105 | 16  --------------------< 146     (01-22-23 @ 07:27)  4.3 | 21 | 1.10       PT: 15.4<H> 01-20-23 @ 17:08  aPTT: 30.0 01-20-23 @ 17:08   INR: 1.34<H> 01-20-23 @ 17:08      Physical Exam:     General: No acute distress.   Respiratory: Breathing comfortably.   Abdomen: Non-distended. No tenderness to palpation. Right nephrostomy tube with clear urine in the drainage bag.       Impression:  57 y.o. F with sepsis secondary to an obstructing 1.6 cm proximal right ureter stone with moderate/severe hydronephrosis now s/p right nephrostomy tube placement on 1/20/23.    Recommendations:  - Monitor for signs of sepsis and bleeding. F/u repeat blood cultures.   - Continue with monitoring drainage output.  - Continue with abx.   - Rest of care per primary service and urology.       --  Rupal Srinivasan MD   Interventional Radiology Resident (PGY-4)  Available on [employer] TEAMS    For EMERGENT inquiries/questions:  IR Pager ([facility]): [phone]  IR Pager ([facility]): [phone] ; [phone]    For non-emergent consults/questions:   Please place a sunrise order "Consult- Interventional Radiology" with an appropriate callback number    For questions about scheduling during appropriate work hours, call IR :  [facility]: [phone]  [facility]: [phone]    For outpatient IR booking:  [facility]: [phone]  [facility]: [phone]

## 2023-01-22 NOTE — CONSULT NOTE ADULT - SUBJECTIVE AND OBJECTIVE BOX
HPI:  56y/o female with no significant PMHx presents to ED c/o fever and rigors x 10 days. Admits to general malaise and dull nonradiating right flank pain. She reports urinary frequency but no dysuria, urgency, gross hematuria or abdominal pain. Pt reports she was unable to seek medical attention sooner due to family matters but rigors and weakness worsened today. (20 Jan 2023 19:17)      PAST MEDICAL & SURGICAL HISTORY:  No pertinent past medical history          Review of Systems:   CONSTITUTIONAL: No fever, weight loss, or fatigue  EYES: No eye pain, visual disturbances, or discharge  ENMT:  No difficulty hearing, tinnitus, vertigo; No sinus or throat pain  NECK: No pain or stiffness  BREASTS: No pain, masses, or nipple discharge  RESPIRATORY: No cough, wheezing, chills or hemoptysis; No shortness of breath  CARDIOVASCULAR: No chest pain, palpitations, dizziness, or leg swelling  GASTROINTESTINAL: No abdominal or epigastric pain. No nausea, vomiting, or hematemesis; No diarrhea or constipation. No melena or hematochezia.  GENITOURINARY: No dysuria, frequency, hematuria, or incontinence  NEUROLOGICAL: No headaches, memory loss, loss of strength, numbness, or tremors  SKIN: No itching, burning, rashes, or lesions   LYMPH NODES: No enlarged glands  ENDOCRINE: No heat or cold intolerance; No hair loss  MUSCULOSKELETAL: No joint pain or swelling; No muscle, back, or extremity pain  PSYCHIATRIC: No depression, anxiety, mood swings, or difficulty sleeping  HEME/LYMPH: No easy bruising, or bleeding gums  ALLERY AND IMMUNOLOGIC: No hives or eczema    Allergies    No Known Allergies    Intolerances        Social History:     FAMILY HISTORY:      MEDICATIONS  (STANDING):  heparin   Injectable 5000 Unit(s) SubCutaneous every 8 hours  piperacillin/tazobactam IVPB.. 3.375 Gram(s) IV Intermittent every 8 hours  senna 2 Tablet(s) Oral at bedtime    MEDICATIONS  (PRN):  acetaminophen     Tablet .. 975 milliGRAM(s) Oral every 6 hours PRN Mild Pain (1 - 3)  oxycodone    5 mG/acetaminophen 325 mG 1 Tablet(s) Oral every 4 hours PRN Moderate Pain (4 - 6)  oxycodone    5 mG/acetaminophen 325 mG 2 Tablet(s) Oral every 6 hours PRN Severe Pain (7 - 10)        CAPILLARY BLOOD GLUCOSE        I&O's Summary    22 Jan 2023 07:01  -  23 Jan 2023 07:00  --------------------------------------------------------  IN: 3110 mL / OUT: 1800 mL / NET: 1310 mL    23 Jan 2023 07:01  -  23 Jan 2023 11:08  --------------------------------------------------------  IN: 0 mL / OUT: 200 mL / NET: -200 mL        PHYSICAL EXAM:  GENERAL: NAD, well-developed  HEAD:  Atraumatic, Normocephalic  EYES: EOMI, PERRLA, conjunctiva and sclera clear  NECK: Supple, No JVD  CHEST/LUNG: Clear to auscultation bilaterally; No wheeze  HEART: Regular rate and rhythm; No murmurs, rubs, or gallops  ABDOMEN: Soft, Nontender, Nondistended; Bowel sounds present  EXTREMITIES:  2+ Peripheral Pulses, No clubbing, cyanosis, or edema  PSYCH: AAOx3  NEUROLOGY: non-focal  SKIN: No rashes or lesions    LABS:                        8.3    10.89 )-----------( 174      ( 23 Jan 2023 07:26 )             26.4     01-23    140  |  105  |  12  ----------------------------<  140<H>  4.2   |  23  |  0.91    Ca    8.9      23 Jan 2023 07:25                RADIOLOGY & ADDITIONAL TESTS:    Imaging Personally Reviewed:    Consultant(s) Notes Reviewed:      Care Discussed with Consultants/Other Providers:

## 2023-01-22 NOTE — PROGRESS NOTE ADULT - SUBJECTIVE AND OBJECTIVE BOX
UROLOGY PA PROGRESS NOTE:     Subjective:  no acute events overnight, feeling sore at right flank around NT , no fever/chills     Objective:  Vital signs  T(F): , Max: 98.1 (23 @ 16:33)  HR: 63 (23 @ 09:14)  BP: 129/76 (23 @ 09:14)  SpO2: 95% (23 @ 09:14)  Wt(kg): --    Output      @ 07:01  -   @ 07:00  --------------------------------------------------------  IN: 3480 mL / OUT: 3025 mL / NET: 455 mL    void 950cc  NT 475cc    Physical Exam:  Gen: NAD  Abd: soft, NT/ND, right NT draining clear yellow urine  : voiding     Labs:    11.32 / 25.4  /1.10     10.91 / 25.9  /x                              7.9    11.32 )-----------( 156      ( 2023 07:27 )             25.4         136  |  105  |  16  ----------------------------<  146<H>  4.3   |  21<L>  |  1.10    Ca    9.1      2023 07:27    TPro  7.1  /  Alb  3.2<L>  /  TBili  0.6  /  DBili  x   /  AST  31  /  ALT  31  /  AlkPhos  375<H>      PT/INR - ( 2023 17:08 )   PT: 15.4 sec;   INR: 1.34 ratio         PTT - ( 2023 17:08 )  PTT:30.0 sec  Urinalysis Basic - ( 2023 18:51 )    Color: Yellow / Appearance: Clear / S.043 / pH: x  Gluc: x / Ketone: Negative  / Bili: Negative / Urobili: <2 mg/dL   Blood: x / Protein: 100 mg/dL / Nitrite: Positive   Leuk Esterase: Negative / RBC: See Note /hpf / WBC See Note   Sq Epi: x / Non Sq Epi: x / Bacteria: x        Urine Cx:    Culture - Urine (collected 2023 23:02)  Source: .Urine Nephrostomy - Right  Preliminary Report (2023 09:39):    >100,000 CFU/ml Escherichia coli    Culture - Blood (collected 2023 17:15)  Source: .Blood Blood  Gram Stain (2023 08:31):    Growth in anaerobic bottle: Gram Negative Rods    Growth in aerobic bottle: Gram Negative Rods  Preliminary Report (2023 20:57):    Growth in aerobic and anaerobic bottles: Escherichia coli    ***Blood Panel PCR results on this specimen are available    approximately 3 hours after the Gram stain result.***    Gram stain, PCR, and/or culture results may not always    correspond due to difference in methodologies.    ************************************************************    This PCR assay was performed by multiplex PCR. This    Assay tests for 66 bacterial and resistance gene targets.    Please refer to the [facility] test directory    at [email or web address] for details.  Organism: Blood Culture PCR (2023 07:09)  Organism: Blood Culture PCR (2023 07:09)    Culture - Blood (collected 2023 16:55)  Source: .Blood Blood-Peripheral  Gram Stain (2023 17:44):    Growth in anaerobic bottle: Gram Negative Rods    Growth in aerobic bottle: Gram Negative Rods  Preliminary Report (2023 22:17):    Growth in anaerobic bottle: Escherichia coli    See previous culture [id number]    Growth in aerobic bottle: Gram Negative Rods    Culture - Blood (collected 2023 16:40)  Source: .Blood Blood-Peripheral  Gram Stain (2023 20:25):    Growth in aerobic bottle: Gram Negative Rods  Preliminary Report (2023 20:25):    Growth in aerobic bottle: Gram Negative Rods

## 2023-01-22 NOTE — CONSULT NOTE ADULT - ASSESSMENT
56y/o female with sepsis secondary to a 1.6cm right proximal stone with associated moderate to severe hydronephrosis, s/p IR R NT 1/20/23      s/p RNT 1/20   -IV ABX Zosyn     Blood cx Ecoli   - Follow up repeat Blood cx      - cont NT to drainage  D/C planning  am tomorrow     Patient requesting for Ot patient referral

## 2023-01-22 NOTE — PROVIDER CONTACT NOTE (CRITICAL VALUE NOTIFICATION) - ACTION/TREATMENT ORDERED:
continue to monitor
BROOKLYNN Ma made aware. known results, pt on zosyn
Provider notified. No interventions ordered at this moment
PA aware, pt already started on zosyn 3.375mg IVPB.

## 2023-01-22 NOTE — PROVIDER CONTACT NOTE (CRITICAL VALUE NOTIFICATION) - ASSESSMENT
Pt is AOx4 at baseline. VSS, afebrile overnight.
Pt is AxOX4, VSS.
pt A&Ox4, VSS.
Pt VSS, on abx, denies chest pain or SOB

## 2023-01-22 NOTE — PROVIDER CONTACT NOTE (CRITICAL VALUE NOTIFICATION) - SITUATION
BCx in anaerobic bottle grew gram negative rods
BCx drawn from 1/20: aerobic bottle growing e. coli and anaerobic growing gram (-) rods
Blood culture from 1/21 growth in anaerobic bottle gram negative rods
Blood cultures + for gram negative rods

## 2023-01-22 NOTE — PROVIDER CONTACT NOTE (CRITICAL VALUE NOTIFICATION) - TEST AND RESULT REPORTED:
BCx drawn from 1/20: aerobic bottle growing e. coli and anaerobic growing gram (-) rods
Blood culture from 1/21 growth in anaerobic bottle gram negative rods
BCx in anaerobic bottle grew gram negative rods
Blood cultures + for gram negative rods

## 2023-01-22 NOTE — PROGRESS NOTE ADULT - ATTENDING COMMENTS
Doing well.  No fever/chills  Pain resolved. Nephrostomy draining clear urine.  Repeat blood culture sent yesterday.  Continue IV abx.
Clinically improved.

## 2023-01-23 ENCOUNTER — TRANSCRIPTION ENCOUNTER (OUTPATIENT)
Age: 58
End: 2023-01-23

## 2023-01-23 VITALS
DIASTOLIC BLOOD PRESSURE: 87 MMHG | RESPIRATION RATE: 18 BRPM | OXYGEN SATURATION: 97 % | HEART RATE: 62 BPM | TEMPERATURE: 98 F | SYSTOLIC BLOOD PRESSURE: 158 MMHG

## 2023-01-23 LAB
-  AMIKACIN: SIGNIFICANT CHANGE UP
-  AMIKACIN: SIGNIFICANT CHANGE UP
-  AMOXICILLIN/CLAVULANIC ACID: SIGNIFICANT CHANGE UP
-  AMOXICILLIN/CLAVULANIC ACID: SIGNIFICANT CHANGE UP
-  AMPICILLIN/SULBACTAM: SIGNIFICANT CHANGE UP
-  AMPICILLIN/SULBACTAM: SIGNIFICANT CHANGE UP
-  AMPICILLIN: SIGNIFICANT CHANGE UP
-  AMPICILLIN: SIGNIFICANT CHANGE UP
-  AZTREONAM: SIGNIFICANT CHANGE UP
-  AZTREONAM: SIGNIFICANT CHANGE UP
-  CEFAZOLIN: SIGNIFICANT CHANGE UP
-  CEFAZOLIN: SIGNIFICANT CHANGE UP
-  CEFEPIME: SIGNIFICANT CHANGE UP
-  CEFEPIME: SIGNIFICANT CHANGE UP
-  CEFOXITIN: SIGNIFICANT CHANGE UP
-  CEFOXITIN: SIGNIFICANT CHANGE UP
-  CEFTRIAXONE: SIGNIFICANT CHANGE UP
-  CEFTRIAXONE: SIGNIFICANT CHANGE UP
-  CEFUROXIME: SIGNIFICANT CHANGE UP
-  CEFUROXIME: SIGNIFICANT CHANGE UP
-  CIPROFLOXACIN: SIGNIFICANT CHANGE UP
-  CIPROFLOXACIN: SIGNIFICANT CHANGE UP
-  ERTAPENEM: SIGNIFICANT CHANGE UP
-  ERTAPENEM: SIGNIFICANT CHANGE UP
-  GENTAMICIN: SIGNIFICANT CHANGE UP
-  GENTAMICIN: SIGNIFICANT CHANGE UP
-  IMIPENEM: SIGNIFICANT CHANGE UP
-  IMIPENEM: SIGNIFICANT CHANGE UP
-  LEVOFLOXACIN: SIGNIFICANT CHANGE UP
-  LEVOFLOXACIN: SIGNIFICANT CHANGE UP
-  MEROPENEM: SIGNIFICANT CHANGE UP
-  MEROPENEM: SIGNIFICANT CHANGE UP
-  NITROFURANTOIN: SIGNIFICANT CHANGE UP
-  NITROFURANTOIN: SIGNIFICANT CHANGE UP
-  PIPERACILLIN/TAZOBACTAM: SIGNIFICANT CHANGE UP
-  PIPERACILLIN/TAZOBACTAM: SIGNIFICANT CHANGE UP
-  TOBRAMYCIN: SIGNIFICANT CHANGE UP
-  TOBRAMYCIN: SIGNIFICANT CHANGE UP
-  TRIMETHOPRIM/SULFAMETHOXAZOLE: SIGNIFICANT CHANGE UP
-  TRIMETHOPRIM/SULFAMETHOXAZOLE: SIGNIFICANT CHANGE UP
ANION GAP SERPL CALC-SCNC: 12 MMOL/L — SIGNIFICANT CHANGE UP (ref 5–17)
BUN SERPL-MCNC: 12 MG/DL — SIGNIFICANT CHANGE UP (ref 7–23)
CALCIUM SERPL-MCNC: 8.9 MG/DL — SIGNIFICANT CHANGE UP (ref 8.4–10.5)
CHLORIDE SERPL-SCNC: 105 MMOL/L — SIGNIFICANT CHANGE UP (ref 96–108)
CO2 SERPL-SCNC: 23 MMOL/L — SIGNIFICANT CHANGE UP (ref 22–31)
CREAT SERPL-MCNC: 0.91 MG/DL — SIGNIFICANT CHANGE UP (ref 0.5–1.3)
CULTURE RESULTS: SIGNIFICANT CHANGE UP
EGFR: 74 ML/MIN/1.73M2 — SIGNIFICANT CHANGE UP
GLUCOSE SERPL-MCNC: 140 MG/DL — HIGH (ref 70–99)
HCT VFR BLD CALC: 26.4 % — LOW (ref 34.5–45)
HGB BLD-MCNC: 8.3 G/DL — LOW (ref 11.5–15.5)
MCHC RBC-ENTMCNC: 28.5 PG — SIGNIFICANT CHANGE UP (ref 27–34)
MCHC RBC-ENTMCNC: 31.4 GM/DL — LOW (ref 32–36)
MCV RBC AUTO: 90.7 FL — SIGNIFICANT CHANGE UP (ref 80–100)
METHOD TYPE: SIGNIFICANT CHANGE UP
METHOD TYPE: SIGNIFICANT CHANGE UP
NRBC # BLD: 0 /100 WBCS — SIGNIFICANT CHANGE UP (ref 0–0)
ORGANISM # SPEC MICROSCOPIC CNT: SIGNIFICANT CHANGE UP
PLATELET # BLD AUTO: 174 K/UL — SIGNIFICANT CHANGE UP (ref 150–400)
POTASSIUM SERPL-MCNC: 4.2 MMOL/L — SIGNIFICANT CHANGE UP (ref 3.5–5.3)
POTASSIUM SERPL-SCNC: 4.2 MMOL/L — SIGNIFICANT CHANGE UP (ref 3.5–5.3)
RBC # BLD: 2.91 M/UL — LOW (ref 3.8–5.2)
RBC # FLD: 15 % — HIGH (ref 10.3–14.5)
SODIUM SERPL-SCNC: 140 MMOL/L — SIGNIFICANT CHANGE UP (ref 135–145)
SPECIMEN SOURCE: SIGNIFICANT CHANGE UP
WBC # BLD: 10.89 K/UL — HIGH (ref 3.8–10.5)
WBC # FLD AUTO: 10.89 K/UL — HIGH (ref 3.8–10.5)

## 2023-01-23 PROCEDURE — 80053 COMPREHEN METABOLIC PANEL: CPT

## 2023-01-23 PROCEDURE — 85027 COMPLETE CBC AUTOMATED: CPT

## 2023-01-23 PROCEDURE — 74177 CT ABD & PELVIS W/CONTRAST: CPT | Mod: MA

## 2023-01-23 PROCEDURE — C1729: CPT

## 2023-01-23 PROCEDURE — C1769: CPT

## 2023-01-23 PROCEDURE — 87186 SC STD MICRODIL/AGAR DIL: CPT

## 2023-01-23 PROCEDURE — 84132 ASSAY OF SERUM POTASSIUM: CPT

## 2023-01-23 PROCEDURE — 87040 BLOOD CULTURE FOR BACTERIA: CPT

## 2023-01-23 PROCEDURE — 82330 ASSAY OF CALCIUM: CPT

## 2023-01-23 PROCEDURE — 85730 THROMBOPLASTIN TIME PARTIAL: CPT

## 2023-01-23 PROCEDURE — 71045 X-RAY EXAM CHEST 1 VIEW: CPT

## 2023-01-23 PROCEDURE — 99232 SBSQ HOSP IP/OBS MODERATE 35: CPT

## 2023-01-23 PROCEDURE — 83605 ASSAY OF LACTIC ACID: CPT

## 2023-01-23 PROCEDURE — C1894: CPT

## 2023-01-23 PROCEDURE — 85018 HEMOGLOBIN: CPT

## 2023-01-23 PROCEDURE — 87086 URINE CULTURE/COLONY COUNT: CPT

## 2023-01-23 PROCEDURE — 87077 CULTURE AEROBIC IDENTIFY: CPT

## 2023-01-23 PROCEDURE — 0225U NFCT DS DNA&RNA 21 SARSCOV2: CPT

## 2023-01-23 PROCEDURE — 99285 EMERGENCY DEPT VISIT HI MDM: CPT

## 2023-01-23 PROCEDURE — 82962 GLUCOSE BLOOD TEST: CPT

## 2023-01-23 PROCEDURE — 50432 PLMT NEPHROSTOMY CATHETER: CPT

## 2023-01-23 PROCEDURE — 85025 COMPLETE CBC W/AUTO DIFF WBC: CPT

## 2023-01-23 PROCEDURE — 87150 DNA/RNA AMPLIFIED PROBE: CPT

## 2023-01-23 PROCEDURE — 82947 ASSAY GLUCOSE BLOOD QUANT: CPT

## 2023-01-23 PROCEDURE — 85610 PROTHROMBIN TIME: CPT

## 2023-01-23 PROCEDURE — 81001 URINALYSIS AUTO W/SCOPE: CPT

## 2023-01-23 PROCEDURE — 82803 BLOOD GASES ANY COMBINATION: CPT

## 2023-01-23 PROCEDURE — 99231 SBSQ HOSP IP/OBS SF/LOW 25: CPT

## 2023-01-23 PROCEDURE — 86900 BLOOD TYPING SEROLOGIC ABO: CPT

## 2023-01-23 PROCEDURE — 80048 BASIC METABOLIC PNL TOTAL CA: CPT

## 2023-01-23 PROCEDURE — 86850 RBC ANTIBODY SCREEN: CPT

## 2023-01-23 PROCEDURE — 85014 HEMATOCRIT: CPT

## 2023-01-23 PROCEDURE — 36415 COLL VENOUS BLD VENIPUNCTURE: CPT

## 2023-01-23 PROCEDURE — 84702 CHORIONIC GONADOTROPIN TEST: CPT

## 2023-01-23 PROCEDURE — 84484 ASSAY OF TROPONIN QUANT: CPT

## 2023-01-23 PROCEDURE — 96374 THER/PROPH/DIAG INJ IV PUSH: CPT

## 2023-01-23 PROCEDURE — 86901 BLOOD TYPING SEROLOGIC RH(D): CPT

## 2023-01-23 PROCEDURE — 84295 ASSAY OF SERUM SODIUM: CPT

## 2023-01-23 PROCEDURE — 82435 ASSAY OF BLOOD CHLORIDE: CPT

## 2023-01-23 RX ORDER — SENNA PLUS 8.6 MG/1
2 TABLET ORAL
Qty: 0 | Refills: 0 | DISCHARGE
Start: 2023-01-23

## 2023-01-23 RX ORDER — CIPROFLOXACIN LACTATE 400MG/40ML
1 VIAL (ML) INTRAVENOUS
Qty: 20 | Refills: 0
Start: 2023-01-23 | End: 2023-02-01

## 2023-01-23 RX ORDER — ACETAMINOPHEN 500 MG
3 TABLET ORAL
Qty: 0 | Refills: 0 | DISCHARGE
Start: 2023-01-23

## 2023-01-23 RX ADMIN — Medication 975 MILLIGRAM(S): at 05:19

## 2023-01-23 RX ADMIN — HEPARIN SODIUM 5000 UNIT(S): 5000 INJECTION INTRAVENOUS; SUBCUTANEOUS at 05:18

## 2023-01-23 RX ADMIN — Medication 975 MILLIGRAM(S): at 06:00

## 2023-01-23 RX ADMIN — PIPERACILLIN AND TAZOBACTAM 25 GRAM(S): 4; .5 INJECTION, POWDER, LYOPHILIZED, FOR SOLUTION INTRAVENOUS at 05:19

## 2023-01-23 NOTE — PROGRESS NOTE ADULT - SUBJECTIVE AND OBJECTIVE BOX
Interventional Radiology Follow-Up Note.    Patient seen and examined @ bedside.    This is a 57y Female s/p Right percutaneous nephrostomy tube placement on 1/20 in Interventional Radiology with Dr. Bain.     Patient reports feeling a pinch feeling intermittently at drain site.      Medication:  heparin   Injectable: (01-23)  piperacillin/tazobactam IVPB.-: (01-21)  piperacillin/tazobactam IVPB..: (01-23)    Vitals:  T(F): 98.2, Max: 98.2 (05:05)  HR: 70  BP: 137/85  RR: 18  SpO2: 96%    Physical Exam:  General: Nontoxic, in NAD.  Abdomen: soft, NTND.   Drain Device: Drain intact attached to gravity bag.  Flushed with 5cc NS w/o difficulty, no leaking. Dressing clean, dry, intact.   24hr Drain output: 1800cc clear urine          LABS:  Na: 140 (01-23 @ 07:25), 136 (01-22 @ 07:27), 137 (01-21 @ 07:05), 134 (01-20 @ 17:08)  K: 4.2 (01-23 @ 07:25), 4.3 (01-22 @ 07:27), 4.4 (01-21 @ 07:05), 4.8 (01-20 @ 17:08)  Cl: 105 (01-23 @ 07:25), 105 (01-22 @ 07:27), 105 (01-21 @ 07:05), 99 (01-20 @ 17:08)  CO2: 23 (01-23 @ 07:25), 21 (01-22 @ 07:27), 21 (01-21 @ 07:05), 19 (01-20 @ 17:08)  BUN: 12 (01-23 @ 07:25), 16 (01-22 @ 07:27), 18 (01-21 @ 07:05), 21 (01-20 @ 17:08)  Cr: 0.91 (01-23 @ 07:25), 1.10 (01-22 @ 07:27), 0.99 (01-21 @ 07:05), 1.13 (01-20 @ 17:08)  Glu: 140(01-23 @ 07:25), 146(01-22 @ 07:27), 111(01-21 @ 07:05), 132(01-20 @ 17:08)    Hgb: 8.3 (01-23 @ 07:26), 7.9 (01-22 @ 07:27), 8.2 (01-21 @ 07:06), 10.2 (01-20 @ 17:08)  Hct: 26.4 (01-23 @ 07:26), 25.4 (01-22 @ 07:27), 25.9 (01-21 @ 07:06), 31.7 (01-20 @ 17:08)  WBC: 10.89 (01-23 @ 07:26), 11.32 (01-22 @ 07:27), 10.91 (01-21 @ 07:06), 12.48 (01-20 @ 17:08)  Plt: 174 (01-23 @ 07:26), 156 (01-22 @ 07:27), 116 (01-21 @ 07:06), 176 (01-20 @ 17:08)    INR: 1.34 01-20-23 @ 17:08  PTT: 30.0 01-20-23 @ 17:08        Bilirubin Total, Serum: 0.6 mg/dL (01-20-23 @ 17:08)          Assessment/Plan:  57 y.o. F with sepsis secondary to 1.6 cm obstructing right proximal ureter stone with moderate/severe hydronephrosis s/p right nephrostomy tube placement on 1/20/23.    - slight leukocytosis, continue abx per primary team  - Flush drain with 5cc normal saline daily forward only; DO NOT aspirate  - Change dressing q3 days or when dressing is saturated.  - Trend vs/labs  - Continue global management per primary team  - Pt can make an appointment with IR by calling the IR booking office at [phone]; recommend IR follow in 3 months for routine tube exchange.  - Pt will benefit from VNS service to help with drainage catheter care; Pt should continue same drainage catheter care as an outpatient.  - Greater than 50% of the encounter was spent counseling and/ or coordination of care on drain care and follow up.      Please call IR at [phone] with any questions, concerns, or issues regarding above.    Also available on [employer] TE[employer].

## 2023-01-23 NOTE — DISCHARGE NOTE PROVIDER - NSDCMRMEDTOKEN_GEN_ALL_CORE_FT
acetaminophen 325 mg oral tablet: 3 tab(s) orally every 6 hours, As needed, Mild Pain (1 - 3)  senna leaf extract oral tablet: 2 tab(s) orally once a day (at bedtime)   acetaminophen 325 mg oral tablet: 3 tab(s) orally every 6 hours, As needed, Mild Pain (1 - 3)  Cipro 500 mg oral tablet: 1 tab(s) orally every 12 hours   senna leaf extract oral tablet: 2 tab(s) orally once a day (at bedtime)

## 2023-01-23 NOTE — DISCHARGE NOTE PROVIDER - CARE PROVIDER_API CALL
Hoenig, David M (MD)  Urology  [facility]- Dept. of Urology, [address] [place] [address]  Phone: [phone]  Fax: [phone]  Follow Up Time:    Hoenig, David M (MD)  Urology  [facility]- Dept. of Urology, [address] [place] [address]  Phone: [phone]  Fax: [phone]  Follow Up Time:     Marietta Samson; MBBS)  Family Medicine  [address] [place] [address]  Phone: [phone]  Fax: [phone]  Follow Up Time:    Hoenig, David M (MD)  Urology  [facility]- Dept. of Urology, [address] [place] [address]  Phone: [phone]  Fax: [phone]  Follow Up Time:     Marietta Samson; MBBS)  Family Medicine  [address] [place] [address]  Phone: [phone]  Fax: [phone]  Follow Up Time:     Dixon Bain)  Interventional Radiology and Diagnostic Radiology  [address] [facility], [address]st [place] [address]  Phone: [phone]  Fax: [phone]  Follow Up Time:

## 2023-01-23 NOTE — PROGRESS NOTE ADULT - ASSESSMENT
56y/o female with sepsis secondary to a 1.6cm right proximal stone with associated moderate to severe hydronephrosis, s/p IR R NT , POD#2  - am labs  - zosyn  - f/u bcx- prelim ecoli, awaiting sensitivities   - repeat blood cultures pending  - f/u urine and IR cx final  - f/u IR  - cont NT to drainage  - trend fevers
58y/o female with sepsis secondary to a 1.6cm right proximal stone with associated moderate to severe hydronephrosis, s/p IR R NT , POD#1  - am labs  - zosyn  - f/u bcx- prelim GNR  - send repeat blood cultures tomorrow   - f/u urine and IR cx  - f/u IR  - cont NT to drainage  - trend fevers
58y/o female with sepsis secondary to a 1.6cm right proximal stone with associated moderate to severe hydronephrosis, s/p IR R NT 1/20/23    - am labs  - zosyn  - f/u bcx- ecoli  - repeat blood cultures pending  - f/u urine and IR cx final  - cont NT to drainage  - anticipate dc home later today

## 2023-01-23 NOTE — DISCHARGE NOTE PROVIDER - HOSPITAL COURSE
Pt is a  58 yo f without sig past medical hx who arrives at [facility] on 1/20/23 with /o fever and rigors x 10 days. Admits to general malaise and dull nonradiating right flank pain. She reports urinary frequency. She was dx with 1.6 cm right proximal stone and required NT. Blood cx grew ecoli and repeats were ngtd. Labs and vitals were stable. She was given a 14 day total course of oral antibiotics   She will fu with a new PMD for the anemia workup Pt is a  58 yo f without sig past medical hx who arrives at [facility] on 1/20/23 with /o fever and rigors x 10 days. Admits to general malaise and dull nonradiating right flank pain. She reports urinary frequency. She was dx with 1.6 cm right proximal stone and required NT. She suffered ecoli bacteremia  and repeat bcx  were ngtd. Labs and vitals were stable. She was given a 14 day total course of oral antibiotics   She will fu with a new PMD for the anemia workup

## 2023-01-23 NOTE — DISCHARGE NOTE PROVIDER - NSDCCPTREATMENT_GEN_ALL_CORE_FT
PRINCIPAL PROCEDURE  Procedure: Nephrostomy with tube drainage  Findings and Treatment: You had a right percutaneous nephrostomy drain placed by Dr. Bain on 1/20 in Interventional Radiology (IR).   Monitor site for any sign or symptoms of infection (painful red skin, green/ yellow foul smelling discharge from the insertion site, fever, chills, leakage around drain site).   Flush drain with 5mL daily. If you meet resistance upon flushing, STOP and contact IR.   Empty drainage bag or bulb daily and record output.   Drain care:  -Disconnect tubing (tube attached to bag/ bulb)  from the catheter (catheter going into skin)  -Clean catheter with alcohol swab  -Twist on the flush syringe to the catheter (be sure to push the air out of syringe)  -Flush catheter with 5mL (DO NOT pull back on syringe). If you meet resistance upon flushing, STOP and contact IR.   -Disconnect syringe from catheter and reconnect drainage bag or bulb.  Dressing care:  -Wash hands thoroughly with water and soap for at least 20 seconds.   -take off old dressing and clean around drain site with gauze soaked with warm water  -After cleaning the site, dry and replace dressing with a new gauze and tegaderm.   -Place one piece of gauze underneath the drain and another piece of gauze on top of drain.   -Apply tegaderm (clear dressing) on top.  -Change dressing every 3 days or when soiled  Follow up every 3 months for routine exchange. Call to make appt at [phone]

## 2023-01-23 NOTE — DISCHARGE NOTE NURSING/CASE MANAGEMENT/SOCIAL WORK - NSDCPEFALRISK_GEN_ALL_CORE
For information on Fall & Injury Prevention, visit: [email or web address] OR  [email or web address] OR  https://www.cdc.gov/steadi/patient.html Minocycline Counseling: Patient advised regarding possible photosensitivity and discoloration of the teeth, skin, lips, tongue and gums.  Patient instructed to avoid sunlight, if possible.  When exposed to sunlight, patients should wear protective clothing, sunglasses, and sunscreen.  The patient was instructed to call the office immediately if the following severe adverse effects occur:  hearing changes, easy bruising/bleeding, severe headache, or vision changes.  The patient verbalized understanding of the proper use and possible adverse effects of minocycline.  All of the patient's questions and concerns were addressed. Bactrim Pregnancy And Lactation Text: This medication is Pregnancy Category D and is known to cause fetal risk.  It is also excreted in breast milk. Tazorac Pregnancy And Lactation Text: This medication is not safe during pregnancy. It is unknown if this medication is excreted in breast milk. Isotretinoin Counseling: Patient should get monthly blood tests, not donate blood, not drive at night if vision affected, not share medication, and not undergo elective surgery for 6 months after tx completed. Side effects reviewed, pt to contact office should one occur. Spironolactone Counseling: Patient advised regarding risks of diarrhea, abdominal pain, hyperkalemia, birth defects (for female patients), liver toxicity and renal toxicity. The patient may need blood work to monitor liver and kidney function and potassium levels while on therapy. The patient verbalized understanding of the proper use and possible adverse effects of spironolactone.  All of the patient's questions and concerns were addressed. Topical Sulfur Applications Pregnancy And Lactation Text: This medication is Pregnancy Category C and has an unknown safety profile during pregnancy. It is unknown if this topical medication is excreted in breast milk. Doxycycline Counseling:  Patient counseled regarding possible photosensitivity and increased risk for sunburn.  Patient instructed to avoid sunlight, if possible.  When exposed to sunlight, patients should wear protective clothing, sunglasses, and sunscreen.  The patient was instructed to call the office immediately if the following severe adverse effects occur:  hearing changes, easy bruising/bleeding, severe headache, or vision changes.  The patient verbalized understanding of the proper use and possible adverse effects of doxycycline.  All of the patient's questions and concerns were addressed. Benzoyl Peroxide Counseling: Patient counseled that medicine may cause skin irritation and bleach clothing.  In the event of skin irritation, the patient was advised to reduce the amount of the drug applied or use it less frequently.   The patient verbalized understanding of the proper use and possible adverse effects of benzoyl peroxide.  All of the patient's questions and concerns were addressed. Birth Control Pills Counseling: Birth Control Pill Counseling: I discussed with the patient the potential side effects of OCPs including but not limited to increased risk of stroke, heart attack, thrombophlebitis, deep venous thrombosis, hepatic adenomas, breast changes, GI upset, headaches, and depression.  The patient verbalized understanding of the proper use and possible adverse effects of OCPs. All of the patient's questions and concerns were addressed. Tazorac Counseling:  Patient advised that medication is irritating and drying.  Patient may need to apply sparingly and wash off after an hour before eventually leaving it on overnight.  The patient verbalized understanding of the proper use and possible adverse effects of tazorac.  All of the patient's questions and concerns were addressed. Azithromycin Counseling:  I discussed with the patient the risks of azithromycin including but not limited to GI upset, allergic reaction, drug rash, diarrhea, and yeast infections. Minocycline Pregnancy And Lactation Text: This medication is Pregnancy Category D and not consider safe during pregnancy. It is also excreted in breast milk. Isotretinoin Pregnancy And Lactation Text: This medication is Pregnancy Category X and is considered extremely dangerous during pregnancy. It is unknown if it is excreted in breast milk. Topical Retinoid Pregnancy And Lactation Text: This medication is Pregnancy Category C. It is unknown if this medication is excreted in breast milk. Topical Sulfur Applications Counseling: Topical Sulfur Counseling: Patient counseled that this medication may cause skin irritation or allergic reactions.  In the event of skin irritation, the patient was advised to reduce the amount of the drug applied or use it less frequently.   The patient verbalized understanding of the proper use and possible adverse effects of topical sulfur application.  All of the patient's questions and concerns were addressed. Sarecycline Counseling: Patient advised regarding possible photosensitivity and discoloration of the teeth, skin, lips, tongue and gums.  Patient instructed to avoid sunlight, if possible.  When exposed to sunlight, patients should wear protective clothing, sunglasses, and sunscreen.  The patient was instructed to call the office immediately if the following severe adverse effects occur:  hearing changes, easy bruising/bleeding, severe headache, or vision changes.  The patient verbalized understanding of the proper use and possible adverse effects of sarecycline.  All of the patient's questions and concerns were addressed. Doxycycline Pregnancy And Lactation Text: This medication is Pregnancy Category D and not consider safe during pregnancy. It is also excreted in breast milk but is considered safe for shorter treatment courses. Use Enhanced Medication Counseling?: No Birth Control Pills Pregnancy And Lactation Text: This medication should be avoided if pregnant and for the first 30 days post-partum. Topical Clindamycin Pregnancy And Lactation Text: This medication is Pregnancy Category B and is considered safe during pregnancy. It is unknown if it is excreted in breast milk. High Dose Vitamin A Counseling: Side effects reviewed, pt to contact office should one occur. Azithromycin Pregnancy And Lactation Text: This medication is considered safe during pregnancy and is also secreted in breast milk. Erythromycin Counseling:  I discussed with the patient the risks of erythromycin including but not limited to GI upset, allergic reaction, drug rash, diarrhea, increase in liver enzymes, and yeast infections. Tetracycline Counseling: Patient counseled regarding possible photosensitivity and increased risk for sunburn.  Patient instructed to avoid sunlight, if possible.  When exposed to sunlight, patients should wear protective clothing, sunglasses, and sunscreen.  The patient was instructed to call the office immediately if the following severe adverse effects occur:  hearing changes, easy bruising/bleeding, severe headache, or vision changes.  The patient verbalized understanding of the proper use and possible adverse effects of tetracycline.  All of the patient's questions and concerns were addressed. Patient understands to avoid pregnancy while on therapy due to potential birth defects. Topical Retinoid counseling:  Patient advised to apply a pea-sized amount only at bedtime and wait 30 minutes after washing their face before applying.  If too drying, patient may add a non-comedogenic moisturizer. The patient verbalized understanding of the proper use and possible adverse effects of retinoids.  All of the patient's questions and concerns were addressed. Dapsone Counseling: I discussed with the patient the risks of dapsone including but not limited to hemolytic anemia, agranulocytosis, rashes, methemoglobinemia, kidney failure, peripheral neuropathy, headaches, GI upset, and liver toxicity.  Patients who start dapsone require monitoring including baseline LFTs and weekly CBCs for the first month, then every month thereafter.  The patient verbalized understanding of the proper use and possible adverse effects of dapsone.  All of the patient's questions and concerns were addressed. Topical Clindamycin Counseling: Patient counseled that this medication may cause skin irritation or allergic reactions.  In the event of skin irritation, the patient was advised to reduce the amount of the drug applied or use it less frequently.   The patient verbalized understanding of the proper use and possible adverse effects of clindamycin.  All of the patient's questions and concerns were addressed. High Dose Vitamin A Pregnancy And Lactation Text: High dose vitamin A therapy is contraindicated during pregnancy and breast feeding. Bactrim Counseling:  I discussed with the patient the risks of sulfa antibiotics including but not limited to GI upset, allergic reaction, drug rash, diarrhea, dizziness, photosensitivity, and yeast infections.  Rarely, more serious reactions can occur including but not limited to aplastic anemia, agranulocytosis, methemoglobinemia, blood dyscrasias, liver or kidney failure, lung infiltrates or desquamative/blistering drug rashes. Spironolactone Pregnancy And Lactation Text: This medication can cause feminization of the male fetus and should be avoided during pregnancy. The active metabolite is also found in breast milk. Erythromycin Pregnancy And Lactation Text: This medication is Pregnancy Category B and is considered safe during pregnancy. It is also excreted in breast milk. Detail Level: Zone Benzoyl Peroxide Pregnancy And Lactation Text: This medication is Pregnancy Category C. It is unknown if benzoyl peroxide is excreted in breast milk. Dapsone Pregnancy And Lactation Text: This medication is Pregnancy Category C and is not considered safe during pregnancy or breast feeding.

## 2023-01-23 NOTE — DISCHARGE NOTE PROVIDER - CARE PROVIDERS DIRECT ADDRESSES
,[email or web address]

## 2023-01-23 NOTE — DISCHARGE NOTE PROVIDER - NSDCCPCAREPLAN_GEN_ALL_CORE_FT
PRINCIPAL DISCHARGE DIAGNOSIS  Diagnosis: Pyelonephritis  Assessment and Plan of Treatment: you had a kidney infection taht requirined nephrostomy tube   Please complete your course of antibiotics   Please care for it as you have been instructed.   Call the office if you experience fever, chills, uncontrolled pain, the inability to tolerate liquids, or the urine does not  flow      SECONDARY DISCHARGE DIAGNOSES  Diagnosis: Stone, kidney  Assessment and Plan of Treatment: you required a nephrostomy tube   Pleaes follow up with Dr. Hoenig for ultimate stone removal after your course of antibiotics is complete    Diagnosis: E coli bacteremia  Assessment and Plan of Treatment: You have a blood stream infection due to your kidney stone   Please finish your antibiotics        PRINCIPAL DISCHARGE DIAGNOSIS  Diagnosis: Pyelonephritis  Assessment and Plan of Treatment: you had a kidney infection taht requirined nephrostomy tube   Please complete your course of antibiotics   Please care for it as you have been instructed.   Call the office if you experience fever, chills, uncontrolled pain, the inability to tolerate liquids, or the urine does not  flow      SECONDARY DISCHARGE DIAGNOSES  Diagnosis: Stone, kidney  Assessment and Plan of Treatment: you required a nephrostomy tube   Pleaes follow up with Dr. Hoenig for ultimate stone removal after your course of antibiotics is complete    Diagnosis: E coli bacteremia  Assessment and Plan of Treatment: You have a blood stream infection due to your kidney stone   Please finish your antibiotics       Diagnosis: Anemia  Assessment and Plan of Treatment: You were found to be anemic. Please see the primary care doctor Marietta Samson

## 2023-01-23 NOTE — PROGRESS NOTE ADULT - SUBJECTIVE AND OBJECTIVE BOX
Subjective  feeling well; wants to go home   Objective    Vital signs  T(F): , Max: 98.2 (01-23-23 @ 05:05)  HR: 70 (01-23-23 @ 05:05)  BP: 137/85 (01-23-23 @ 05:05)  SpO2: 96% (01-23-23 @ 05:05)  Wt(kg): --    Output     01-22 @ 07:01  -  01-23 @ 07:00  --------------------------------------------------------  IN: 3110 mL / OUT: 1800 mL / NET: 1310 mL        Gen awake alert nad axox3  Abd soft ntnd   Back tube in place no hematoma/ ecchymosis  urine yellow    nonpalp bladder     Labs      01-23 @ 07:26    WBC 10.89 / Hct 26.4  / SCr --       01-23 @ 07:25    WBC --    / Hct --    / SCr 0.91       Urine Cx: Culture - Urine (01.20.23 @ 23:02)    Specimen Source: .Urine Nephrostomy - Right    Culture Results:   >100,000 CFU/ml Escherichia coli    Culture - Blood (01.20.23 @ 17:15)    -  Escherichia coli: Detec    Gram Stain:   Growth in anaerobic bottle: Gram Negative Rods  Growth in aerobic bottle: Gram Negative Rods    -  Amikacin: S <=16    -  Ampicillin: R >16 These ampicillin results predict results for amoxicillin    -  Ampicillin/Sulbactam: I 16/8 Enterobacter, Klebsiella aerogenes, Citrobacter, and Serratia may develop resistance during prolonged therapy (3-4 days)    -  Aztreonam: S <=4    -  Cefazolin: S <=2 Enterobacter, Klebsiella aerogenes, Citrobacter, and Serratia may develop resistance during prolonged therapy (3-4 days)    -  Cefepime: S <=2    -  Cefoxitin: S <=8    -  Ceftriaxone: S <=1 Enterobacter, Klebsiella aerogenes, Citrobacter, and Serratia may develop resistance during prolonged therapy    -  Ciprofloxacin: S <=0.25    -  Ertapenem: S <=0.5    -  Gentamicin: S <=2    -  Imipenem: S <=1    -  Levofloxacin: S <=0.5    -  Meropenem: S <=1    -  Piperacillin/Tazobactam: S <=8    -  Tobramycin: S <=2    -  Trimethoprim/Sulfamethoxazole: S 1/19    Specimen Source: .Blood Blood    Organism: Blood Culture PCR    Organism: Escherichia coli    Culture Results:   Growth in aerobic and anaerobic bottles: Escherichia coli  ***Blood Panel PCR results on this specimen are available  approximately 3 hours after the Gram stain result.***  Gram stain, PCR, and/or culture results may not always  correspond due to difference in methodologies.  ************************************************************  This PCR assay was performed by multiplex PCR. This  Assay tests for 66 bacterial and resistance gene targets.  Please refer to the [facility] test directory  at [email or web address] for details.    Organism Identification: Blood Culture PCR  Escherichia coli    Method Type: MALINDA    Method Type: PCR    Imaging  < from: CT Abdomen and Pelvis w/ IV Cont (01.20.23 @ 17:53) >  FINDINGS:  LOWER CHEST: Within normal limits.    LIVER: Subcentimeter hypodensity too small to characterize. Hepatomegaly.   Liver measures 23.4 cm in length.  BILE DUCTS: Normal caliber.  GALLBLADDER: Cholecystectomy.  SPLEEN: Splenomegaly measuring 13 cm.  PANCREAS: Within normal limits.  ADRENALS: Within normal limits.  KIDNEYS/URETERS: 1.6 cm obstructing right proximal ureteral calculus with   resultant moderate to severe hydronephrosis. Associated right perinephric   and periureteral inflammatory change and delayed nephrogram. Multiple   cortical medullary hypodensities measuring up to 3.4 x 3.2 cm (3:60),   closely associated with the right renal collecting system. Bilateral   renal hypodensities measure simple fluid attenuation, likely cysts.    BLADDER: Mildly distended.  REPRODUCTIVE ORGANS: Uterus and adnexa within normal limits.    BOWEL: Tiny hiatal hernia. No bowel obstruction. Appendix is normal.  PERITONEUM: No ascites.  VESSELS: Within normal limits.  RETROPERITONEUM/LYMPH NODES: No lymphadenopathy.  ABDOMINAL WALL: Small fat-containing umbilical hernia.  BONES: Within normal limits.    IMPRESSION:  1.6 cm obstructing right proximal ureteral calculus with resultant   moderate to severe hydronephrosis.    Right perinephric and periureteral inflammation with multiple right   hypodense intrarenal collections closely associated with the collecting   system, suggestive of possible superimposed pyonephrosis/pyelonephritis.   Recommend correlation with urinalysis and serum laboratory findings.    Consider urology consultation.      < end of copied text >

## 2023-01-23 NOTE — DISCHARGE NOTE NURSING/CASE MANAGEMENT/SOCIAL WORK - PATIENT PORTAL LINK FT
You can access the FollowMyHealth Patient Portal offered by [facility] by registering at the following website: [email or web address]. By joining [employer]’s FollowMyHealth portal, you will also be able to view your health information using other applications (apps) compatible with our system.

## 2023-01-23 NOTE — DISCHARGE NOTE PROVIDER - PROVIDER TOKENS
PROVIDER:[TOKEN:[[id number]]] PROVIDER:[TOKEN:[[id number]]],PROVIDER:[TOKEN:[[id number]]] PROVIDER:[TOKEN:[[id number]]],PROVIDER:[TOKEN:[[id number]]],PROVIDER:[TOKEN:[[id number]]]

## 2023-01-23 NOTE — DISCHARGE NOTE NURSING/CASE MANAGEMENT/SOCIAL WORK - NSSCTYPOFSERV_GEN_ALL_CORE
for RN evaluation, reinforce teaching on nephrostomy care- please make sure you are taught drain flushing and is given flushes and dressing supplies to take home with you

## 2023-01-27 LAB
CULTURE RESULTS: SIGNIFICANT CHANGE UP
CULTURE RESULTS: SIGNIFICANT CHANGE UP
SPECIMEN SOURCE: SIGNIFICANT CHANGE UP
SPECIMEN SOURCE: SIGNIFICANT CHANGE UP

## 2023-01-30 ENCOUNTER — OUTPATIENT (OUTPATIENT)
Dept: OUTPATIENT SERVICES | Facility: HOSPITAL | Age: 58
LOS: 1 days | End: 2023-01-30
Payer: COMMERCIAL

## 2023-01-30 VITALS
OXYGEN SATURATION: 99 % | RESPIRATION RATE: 16 BRPM | SYSTOLIC BLOOD PRESSURE: 138 MMHG | HEART RATE: 78 BPM | HEIGHT: 64.5 IN | TEMPERATURE: 98 F | DIASTOLIC BLOOD PRESSURE: 89 MMHG | WEIGHT: 195.11 LBS

## 2023-01-30 DIAGNOSIS — Z93.6 OTHER ARTIFICIAL OPENINGS OF URINARY TRACT STATUS: ICD-10-CM

## 2023-01-30 DIAGNOSIS — B37.9 CANDIDIASIS, UNSPECIFIED: ICD-10-CM

## 2023-01-30 DIAGNOSIS — N20.0 CALCULUS OF KIDNEY: ICD-10-CM

## 2023-01-30 DIAGNOSIS — Z90.49 ACQUIRED ABSENCE OF OTHER SPECIFIED PARTS OF DIGESTIVE TRACT: Chronic | ICD-10-CM

## 2023-01-30 DIAGNOSIS — Z01.818 ENCOUNTER FOR OTHER PREPROCEDURAL EXAMINATION: ICD-10-CM

## 2023-01-30 DIAGNOSIS — Z98.890 OTHER SPECIFIED POSTPROCEDURAL STATES: Chronic | ICD-10-CM

## 2023-01-30 LAB
ANION GAP SERPL CALC-SCNC: 12 MMOL/L — SIGNIFICANT CHANGE UP (ref 5–17)
BLD GP AB SCN SERPL QL: NEGATIVE — SIGNIFICANT CHANGE UP
BUN SERPL-MCNC: 21 MG/DL — SIGNIFICANT CHANGE UP (ref 7–23)
CALCIUM SERPL-MCNC: 10.1 MG/DL — SIGNIFICANT CHANGE UP (ref 8.4–10.5)
CHLORIDE SERPL-SCNC: 102 MMOL/L — SIGNIFICANT CHANGE UP (ref 96–108)
CO2 SERPL-SCNC: 24 MMOL/L — SIGNIFICANT CHANGE UP (ref 22–31)
CREAT SERPL-MCNC: 0.87 MG/DL — SIGNIFICANT CHANGE UP (ref 0.5–1.3)
EGFR: 79 ML/MIN/1.73M2 — SIGNIFICANT CHANGE UP
GLUCOSE SERPL-MCNC: 111 MG/DL — HIGH (ref 70–99)
HCT VFR BLD CALC: 35.1 % — SIGNIFICANT CHANGE UP (ref 34.5–45)
HGB BLD-MCNC: 10.8 G/DL — LOW (ref 11.5–15.5)
MCHC RBC-ENTMCNC: 28.1 PG — SIGNIFICANT CHANGE UP (ref 27–34)
MCHC RBC-ENTMCNC: 30.8 GM/DL — LOW (ref 32–36)
MCV RBC AUTO: 91.4 FL — SIGNIFICANT CHANGE UP (ref 80–100)
NRBC # BLD: 0 /100 WBCS — SIGNIFICANT CHANGE UP (ref 0–0)
PLATELET # BLD AUTO: 361 K/UL — SIGNIFICANT CHANGE UP (ref 150–400)
POTASSIUM SERPL-MCNC: 4 MMOL/L — SIGNIFICANT CHANGE UP (ref 3.5–5.3)
POTASSIUM SERPL-SCNC: 4 MMOL/L — SIGNIFICANT CHANGE UP (ref 3.5–5.3)
RBC # BLD: 3.84 M/UL — SIGNIFICANT CHANGE UP (ref 3.8–5.2)
RBC # FLD: 15.8 % — HIGH (ref 10.3–14.5)
RH IG SCN BLD-IMP: POSITIVE — SIGNIFICANT CHANGE UP
SODIUM SERPL-SCNC: 138 MMOL/L — SIGNIFICANT CHANGE UP (ref 135–145)
WBC # BLD: 7.65 K/UL — SIGNIFICANT CHANGE UP (ref 3.8–10.5)
WBC # FLD AUTO: 7.65 K/UL — SIGNIFICANT CHANGE UP (ref 3.8–10.5)

## 2023-01-30 PROCEDURE — 85027 COMPLETE CBC AUTOMATED: CPT

## 2023-01-30 PROCEDURE — G0463: CPT

## 2023-01-30 PROCEDURE — 87086 URINE CULTURE/COLONY COUNT: CPT

## 2023-01-30 PROCEDURE — 86901 BLOOD TYPING SEROLOGIC RH(D): CPT

## 2023-01-30 PROCEDURE — 86900 BLOOD TYPING SEROLOGIC ABO: CPT

## 2023-01-30 PROCEDURE — 86850 RBC ANTIBODY SCREEN: CPT

## 2023-01-30 PROCEDURE — 80048 BASIC METABOLIC PNL TOTAL CA: CPT

## 2023-01-30 RX ORDER — SODIUM CHLORIDE 9 MG/ML
3 INJECTION INTRAMUSCULAR; INTRAVENOUS; SUBCUTANEOUS EVERY 8 HOURS
Refills: 0 | Status: DISCONTINUED | OUTPATIENT
Start: 2023-02-13 | End: 2023-02-13

## 2023-01-30 RX ORDER — CEFAZOLIN SODIUM 1 G
2000 VIAL (EA) INJECTION ONCE
Refills: 0 | Status: COMPLETED | OUTPATIENT
Start: 2023-02-13 | End: 2023-02-13

## 2023-01-30 RX ORDER — LIDOCAINE HCL 20 MG/ML
0.2 VIAL (ML) INJECTION ONCE
Refills: 0 | Status: DISCONTINUED | OUTPATIENT
Start: 2023-02-13 | End: 2023-02-13

## 2023-01-30 RX ORDER — CHLORHEXIDINE GLUCONATE 213 G/1000ML
1 SOLUTION TOPICAL ONCE
Refills: 0 | Status: DISCONTINUED | OUTPATIENT
Start: 2023-02-13 | End: 2023-02-13

## 2023-01-30 NOTE — H&P PST ADULT - ASSESSMENT
Activity: Plays tennis twice a month, outdoor paddle tennis, walking 4 Xs a week    DASI scale: 8.75 DASI     Mallampati: 2    Dentition: Denies loose teeth/dentures Activity: Plays tennis twice a month, outdoor paddle tennis, walking 4 Xs a week    DASI scale: 8.75     Mallampati: 2    Dentition: Denies loose teeth/dentures

## 2023-01-30 NOTE — H&P PST ADULT - NS PRO LACT YNNA
Discharge Note    Patient discharged to home via private vehicle  accompanied by daughter.  IV: Discontinued  Prescriptions e-prescribed to pharmacy.   Belongings reviewed and sent with patient.   Home medications returned to patient: NA  Equipment sent with: patient, N/A.   patient verbalizes understanding of discharge instructions. AVS given to patient.           no

## 2023-01-30 NOTE — H&P PST ADULT - NSICDXPASTMEDICALHX_GEN_ALL_CORE_FT
PAST MEDICAL HISTORY:  H/O bacteremia     PFO (patent foramen ovale)     Renal stone      PAST MEDICAL HISTORY:  H/O bacteremia     H/O varicose veins     PFO (patent foramen ovale)     Renal stone

## 2023-01-30 NOTE — H&P PST ADULT - HISTORY OF PRESENT ILLNESS
56 yo female, no significant PMH, came to ER at [facility] on 1/20/23 with fever and rigors, right flank pain, anemia, CT A/P revealed right hydronephrosis and a 1.6 renal stone that required nephrostomy tube placement on 1/20, + bacteremia, pt. discharged on 1/23 on PO antibiotics and presents to PST for scheduled Right Percutaneous Stone Removal on 2/13/23.   Urine culture from 1/20  gallbladder  knee surgery arthroscopic right acl repair  no covid 56 yo female, PMH PFO - asymptomatic, pt. came to ER at [facility] on 1/20/23 with fever and rigors, right flank pain, anemia, CT A/P revealed right hydronephrosis and a 1.6 renal stone that required nephrostomy tube placement on 1/20, + bacteremia, pt. discharged on 1/23 on PO antibiotics and presents to [facility] for scheduled Right Percutaneous Stone Removal on 2/13/23. Pt. denies COVID-19 infection in 2020 through 2023, denies close contact with anyone that has been ill, no fever, cough, dyspnea in past two weeks.    Pt. is scheduled for COVID-19 testing on 2/10 at [facility] 58 yo female, PMH PFO on echocardiogram 2013 - asymptomatic, varicose veins, pt. came to ER at [facility] on 1/20/23 with fever and rigors, right flank pain, anemia, CT A/P revealed right hydronephrosis and a 1.6 renal stone that required nephrostomy tube placement on 1/20, + bacteremia, pt. discharged on 1/23 on PO antibiotics and [facility] for scheduled Right Percutaneous Stone Removal on 2/13/23. Pt. denies COVID-19 infection in 2020 through 2023, denies close contact with anyone that has been ill, no fever, cough, dyspnea in past two weeks.    Pt. is scheduled for COVID-19 testing on 2/10 at [facility] 56 yo female, PMH PFO on echocardiogram 2013 - asymptomatic, varicose veins, pt. came to ER at [facility] on 1/20/23 with fever and rigors, right flank pain, anemia, CT A/P revealed right hydronephrosis and a 1.6 renal stone that required nephrostomy tube placement on 1/20, + bacteremia, pt. discharged on 1/23 on PO antibiotics and presents to [facility] for scheduled Right Percutaneous Stone Removal on 2/13/23. Pt. denies COVID-19 infection in 2020 through 2023, denies close contact with anyone that has been ill, no fever, cough, dyspnea in past two weeks.    Pt. is scheduled for COVID-19 testing on 2/10 at [facility]    Addendum: 2/1/23 at [facility] - spoke with pt., made aware that anesthesia wants her to see PCP before surgery due to anemia. Pt. verbalized understanding and will call to make appointment with Dr. Marietta Samson. 56 yo female, PMH PFO on echocardiogram 2013 - asymptomatic, varicose veins, pt. came to ER at [facility] on 1/20/23 with fever and rigors, right flank pain, anemia, CT A/P revealed right hydronephrosis and a 1.6 renal stone that required nephrostomy tube placement on 1/20, + bacteremia, pt. discharged on 1/23 on PO antibiotics and presents to [facility] for scheduled Right Percutaneous Stone Removal on 2/13/23. Pt. denies COVID-19 infection in 2020 through 2023, denies close contact with anyone that has been ill, no fever, cough, dyspnea in past two weeks.    Pt. is scheduled for COVID-19 testing on 2/10 at [facility] at [facility]    Addendum: 2/1/23 at 1500 - spoke with pt., made aware that anesthesia wants her to see PCP before surgery due to anemia. Pt. verbalized understanding and will call to make appointment with Dr. Marietta Samson.  Addendum 2/10/23 at 11:35 - spoke with pt.'s  "Sky" who explained they did not see Dr. Samson due to anemia but referred to hematologist, Dr. Jane  - pt. followed up and and saw on 2/6 - impression that anemia was caused by e-coli sepsis and s/p nephrostomy tube - Hematologist called for clearance letter.

## 2023-01-30 NOTE — H&P PST ADULT - PROBLEM SELECTOR PLAN 1
Right Percutaneous Stone Removal on 2/13/23  Pre-op instructions, including Chlorhexidine soap, provided - all questions answered  Labs done at [facility]  COVID swab on 2/10 at [facility]

## 2023-01-30 NOTE — H&P PST ADULT - GENERAL GENITOURINARY SYMPTOMS
bladder infections/increased urinary frequency flank pain R/bladder infections/increased urinary frequency

## 2023-01-30 NOTE — H&P PST ADULT - ATTENDING COMMENTS
for right PCNL after discussion/review all risks/benefits  films reviewed, side marked, consent reviewed and signed

## 2023-01-31 ENCOUNTER — APPOINTMENT (OUTPATIENT)
Dept: UROLOGY | Facility: CLINIC | Age: 58
End: 2023-01-31
Payer: COMMERCIAL

## 2023-01-31 DIAGNOSIS — Q21.1 ATRIAL SEPTAL DEFECT: ICD-10-CM

## 2023-01-31 LAB
CULTURE RESULTS: SIGNIFICANT CHANGE UP
SPECIMEN SOURCE: SIGNIFICANT CHANGE UP

## 2023-01-31 PROCEDURE — 99442: CPT

## 2023-01-31 RX ORDER — NITROFURANTOIN (MONOHYDRATE/MACROCRYSTALS) 25; 75 MG/1; MG/1
100 CAPSULE ORAL
Qty: 30 | Refills: 0 | Status: ACTIVE | COMMUNITY
Start: 2023-01-31 | End: 1900-01-01

## 2023-01-31 NOTE — HISTORY OF PRESENT ILLNESS
[Other Location: e.g. School (Enter Location, City,State)___] : at [unfilled], at the time of the visit. [Other Location: e.g. Home (Enter Location, City,State)___] : at [unfilled] [Verbal consent obtained from patient] : the patient, [unfilled] [FreeTextEntry1] : The patient-doctor relationship has been established in a face to face fashion via real time video/audio HIPAA compliant communication using telemedicine software. The patient's identity has been confirmed. The patient was previously emailed a copy of the telemedicine consent. They have had a chance to review and has now given verbal consent and has requested care to be assessed and treated via telemedicine. They understand there may be limitations in this process, and that they may need further followup care in the office and/or hospital settings.\par \par Verbal consent given on Jan 31 2023  9:20AM\par \par NGOC MCNULTY is a 57 year F who presents for f/u after hospitalization at [facility] for stone/sepsis.\par \par She presented to the ER at [facility] on 1/20/23 with fever and rigors, right flank pain, anemia. CT scan revealed right hydronephrosis and a large, dense (mean 1380 HU) 2.1 x 1.6 cm renal stone that required nephrostomy tube placement on 1/20 for decompression. She had bacteremia, pt. discharged on 1/23 on PO antibiotics-- due to finish.\par \par Planning for surgical management.\par \par \par 01/31/2023 \par \par The patient denies fevers, chills, nausea and/or vomiting, and no unexplained weight loss.\par \par All pertinent parts of the patient PFSH (past medical, family, and social histories), laboratory, radiological studies and available physician notes were reviewed prior to starting the face-to-face portion of the telemedicine visit. Questionnaire results, where appropriate, were discussed with the patient.\par

## 2023-01-31 NOTE — ASSESSMENT
[FreeTextEntry1] : I had long discussion with patient about their stones, and about options, risks, and benefits of all treatments.  Main options for definitive stone treatment include ESWL, URS, PCNL.  \par \par ESWL success best with smaller, less dense stones, and with short skin to stone distance and favorable location of the stone within the urinary tract, while URS is more successful treatment with multiple stones, more dense stones, or challenging body habitus or stone location.  PCNL is best option for larger, more dense and complex stones, and particularly those involving the lower pole.  Non-definitive stone treatment options for drainage, using either stents or nephrostomy, also reviewed: these are of lower immediate surgical risks, but incur multiple procedures to manage and may have their own complications and effects on quality of life.  Still, nephrostomy or nephroureteral catheter can allow maintenance of urinary system drainage without surgical risks, and management in office with exchanges (avoiding the anesthesia and testing which would be present with bilateral internal stent exchange).\par \par Risks of nontreatment with obstruction can lead to very high rate of renal function loss in stone-bearing kidney over the next months to years.\par \par In this patient's case, I recommend... right PCNL \par \par Risks/benefits/success/recovery expectations all reviewed at length, particularly with respect to patient's comorbidities, and inclusive of infection/sepsis, bleeding, need for secondary procedures or secondary stages such as embolization or open surgery, and even risks of death due to acute issues superimposed on comorbidities.\par \par Pt prefers to undergo: right pcnl\par \par Will schedule.\par \par will also start macrobid 100 mg per day suppression through surgery (sensitive)\par

## 2023-02-02 PROBLEM — Z86.79 PERSONAL HISTORY OF OTHER DISEASES OF THE CIRCULATORY SYSTEM: Chronic | Status: ACTIVE | Noted: 2023-01-30

## 2023-02-02 PROBLEM — N20.0 CALCULUS OF KIDNEY: Chronic | Status: ACTIVE | Noted: 2023-01-30

## 2023-02-02 PROBLEM — Q21.12 PATENT FORAMEN OVALE: Chronic | Status: ACTIVE | Noted: 2023-01-30

## 2023-02-02 PROBLEM — Z87.898 PERSONAL HISTORY OF OTHER SPECIFIED CONDITIONS: Chronic | Status: ACTIVE | Noted: 2023-01-30

## 2023-02-10 ENCOUNTER — OUTPATIENT (OUTPATIENT)
Dept: OUTPATIENT SERVICES | Facility: HOSPITAL | Age: 58
LOS: 1 days | End: 2023-02-10
Payer: COMMERCIAL

## 2023-02-10 DIAGNOSIS — Z90.49 ACQUIRED ABSENCE OF OTHER SPECIFIED PARTS OF DIGESTIVE TRACT: Chronic | ICD-10-CM

## 2023-02-10 DIAGNOSIS — Z98.890 OTHER SPECIFIED POSTPROCEDURAL STATES: Chronic | ICD-10-CM

## 2023-02-10 DIAGNOSIS — Z11.52 ENCOUNTER FOR SCREENING FOR COVID-19: ICD-10-CM

## 2023-02-10 LAB — SARS-COV-2 RNA SPEC QL NAA+PROBE: SIGNIFICANT CHANGE UP

## 2023-02-10 PROCEDURE — U0005: CPT

## 2023-02-10 PROCEDURE — U0003: CPT

## 2023-02-10 PROCEDURE — C9803: CPT

## 2023-02-12 ENCOUNTER — TRANSCRIPTION ENCOUNTER (OUTPATIENT)
Age: 58
End: 2023-02-12

## 2023-02-13 ENCOUNTER — APPOINTMENT (OUTPATIENT)
Dept: UROLOGY | Facility: HOSPITAL | Age: 58
End: 2023-02-13

## 2023-02-13 ENCOUNTER — OUTPATIENT (OUTPATIENT)
Dept: INPATIENT UNIT | Facility: HOSPITAL | Age: 58
LOS: 1 days | End: 2023-02-13
Payer: COMMERCIAL

## 2023-02-13 ENCOUNTER — RESULT REVIEW (OUTPATIENT)
Age: 58
End: 2023-02-13

## 2023-02-13 VITALS
HEIGHT: 64.5 IN | RESPIRATION RATE: 17 BRPM | TEMPERATURE: 98 F | HEART RATE: 92 BPM | OXYGEN SATURATION: 98 % | DIASTOLIC BLOOD PRESSURE: 87 MMHG | SYSTOLIC BLOOD PRESSURE: 167 MMHG | WEIGHT: 195.11 LBS

## 2023-02-13 DIAGNOSIS — N20.0 CALCULUS OF KIDNEY: ICD-10-CM

## 2023-02-13 DIAGNOSIS — Z98.890 OTHER SPECIFIED POSTPROCEDURAL STATES: Chronic | ICD-10-CM

## 2023-02-13 DIAGNOSIS — Z90.49 ACQUIRED ABSENCE OF OTHER SPECIFIED PARTS OF DIGESTIVE TRACT: Chronic | ICD-10-CM

## 2023-02-13 DIAGNOSIS — Z93.6 OTHER ARTIFICIAL OPENINGS OF URINARY TRACT STATUS: ICD-10-CM

## 2023-02-13 LAB
ANION GAP SERPL CALC-SCNC: 10 MMOL/L — SIGNIFICANT CHANGE UP (ref 5–17)
BUN SERPL-MCNC: 16 MG/DL — SIGNIFICANT CHANGE UP (ref 7–23)
CALCIUM SERPL-MCNC: 9.2 MG/DL — SIGNIFICANT CHANGE UP (ref 8.4–10.5)
CHLORIDE SERPL-SCNC: 108 MMOL/L — SIGNIFICANT CHANGE UP (ref 96–108)
CO2 SERPL-SCNC: 24 MMOL/L — SIGNIFICANT CHANGE UP (ref 22–31)
CREAT SERPL-MCNC: 0.83 MG/DL — SIGNIFICANT CHANGE UP (ref 0.5–1.3)
EGFR: 83 ML/MIN/1.73M2 — SIGNIFICANT CHANGE UP
GLUCOSE SERPL-MCNC: 117 MG/DL — HIGH (ref 70–99)
HCG UR QL: NEGATIVE — SIGNIFICANT CHANGE UP
HCT VFR BLD CALC: 34.6 % — SIGNIFICANT CHANGE UP (ref 34.5–45)
HGB BLD-MCNC: 11 G/DL — LOW (ref 11.5–15.5)
MCHC RBC-ENTMCNC: 28.9 PG — SIGNIFICANT CHANGE UP (ref 27–34)
MCHC RBC-ENTMCNC: 31.8 GM/DL — LOW (ref 32–36)
MCV RBC AUTO: 90.8 FL — SIGNIFICANT CHANGE UP (ref 80–100)
NRBC # BLD: 0 /100 WBCS — SIGNIFICANT CHANGE UP (ref 0–0)
PLATELET # BLD AUTO: 187 K/UL — SIGNIFICANT CHANGE UP (ref 150–400)
POTASSIUM SERPL-MCNC: 4.1 MMOL/L — SIGNIFICANT CHANGE UP (ref 3.5–5.3)
POTASSIUM SERPL-SCNC: 4.1 MMOL/L — SIGNIFICANT CHANGE UP (ref 3.5–5.3)
RBC # BLD: 3.81 M/UL — SIGNIFICANT CHANGE UP (ref 3.8–5.2)
RBC # FLD: 15.4 % — HIGH (ref 10.3–14.5)
SODIUM SERPL-SCNC: 142 MMOL/L — SIGNIFICANT CHANGE UP (ref 135–145)
WBC # BLD: 8.39 K/UL — SIGNIFICANT CHANGE UP (ref 3.8–10.5)
WBC # FLD AUTO: 8.39 K/UL — SIGNIFICANT CHANGE UP (ref 3.8–10.5)

## 2023-02-13 PROCEDURE — 88300 SURGICAL PATH GROSS: CPT | Mod: 26

## 2023-02-13 DEVICE — GUIDEWIRE SENSOR DUAL-FLEX NITINOL STRAIGHT .035" X 150CM: Type: IMPLANTABLE DEVICE | Site: RIGHT | Status: FUNCTIONAL

## 2023-02-13 DEVICE — AMPLATZ RENAL DILATOR 6FR-30FR X 16CM: Type: IMPLANTABLE DEVICE | Site: RIGHT | Status: FUNCTIONAL

## 2023-02-13 DEVICE — STENT URET 7FR 26CM: Type: IMPLANTABLE DEVICE | Site: RIGHT | Status: FUNCTIONAL

## 2023-02-13 DEVICE — SURGIFLO MATRIX WITH THROMBIN KIT: Type: IMPLANTABLE DEVICE | Site: RIGHT | Status: FUNCTIONAL

## 2023-02-13 DEVICE — TRILOGY PROBE KIT 3.9MM X 440MM (BLUE): Type: IMPLANTABLE DEVICE | Site: RIGHT | Status: FUNCTIONAL

## 2023-02-13 DEVICE — GUIDEWIRE AMPLATZ SUPER-STIFF STRAIGHT .035" X 145CM: Type: IMPLANTABLE DEVICE | Site: RIGHT | Status: FUNCTIONAL

## 2023-02-13 DEVICE — NEPHROSTOMY BALLOON CATH NEPHROMAX 24FR 17CM PTFE: Type: IMPLANTABLE DEVICE | Site: RIGHT | Status: FUNCTIONAL

## 2023-02-13 DEVICE — IMPLANTABLE DEVICE: Type: IMPLANTABLE DEVICE | Site: RIGHT | Status: FUNCTIONAL

## 2023-02-13 DEVICE — URETERAL CATH IMAGER II BERN 5FR 65CM: Type: IMPLANTABLE DEVICE | Site: RIGHT | Status: FUNCTIONAL

## 2023-02-13 DEVICE — URETERAL CATH AXXCESS OPEN END 6FR 70CM: Type: IMPLANTABLE DEVICE | Site: RIGHT | Status: FUNCTIONAL

## 2023-02-13 RX ORDER — HYDROMORPHONE HYDROCHLORIDE 2 MG/ML
0.5 INJECTION INTRAMUSCULAR; INTRAVENOUS; SUBCUTANEOUS
Refills: 0 | Status: DISCONTINUED | OUTPATIENT
Start: 2023-02-13 | End: 2023-02-13

## 2023-02-13 RX ORDER — HEPARIN SODIUM 5000 [USP'U]/ML
5000 INJECTION INTRAVENOUS; SUBCUTANEOUS EVERY 8 HOURS
Refills: 0 | Status: DISCONTINUED | OUTPATIENT
Start: 2023-02-13 | End: 2023-02-27

## 2023-02-13 RX ORDER — SENNA PLUS 8.6 MG/1
2 TABLET ORAL AT BEDTIME
Refills: 0 | Status: DISCONTINUED | OUTPATIENT
Start: 2023-02-13 | End: 2023-02-27

## 2023-02-13 RX ORDER — METOCLOPRAMIDE HCL 10 MG
10 TABLET ORAL ONCE
Refills: 0 | Status: COMPLETED | OUTPATIENT
Start: 2023-02-13 | End: 2023-02-13

## 2023-02-13 RX ORDER — IBUPROFEN 200 MG
600 TABLET ORAL EVERY 6 HOURS
Refills: 0 | Status: DISCONTINUED | OUTPATIENT
Start: 2023-02-13 | End: 2023-02-27

## 2023-02-13 RX ORDER — CIPROFLOXACIN LACTATE 400MG/40ML
1 VIAL (ML) INTRAVENOUS
Qty: 10 | Refills: 0
Start: 2023-02-13 | End: 2023-02-17

## 2023-02-13 RX ORDER — OXYCODONE HYDROCHLORIDE 5 MG/1
5 TABLET ORAL EVERY 6 HOURS
Refills: 0 | Status: DISCONTINUED | OUTPATIENT
Start: 2023-02-13 | End: 2023-02-13

## 2023-02-13 RX ORDER — OXYCODONE HYDROCHLORIDE 5 MG/1
1 TABLET ORAL
Qty: 8 | Refills: 0
Start: 2023-02-13

## 2023-02-13 RX ORDER — CIPROFLOXACIN LACTATE 400MG/40ML
500 VIAL (ML) INTRAVENOUS EVERY 12 HOURS
Refills: 0 | Status: DISCONTINUED | OUTPATIENT
Start: 2023-02-13 | End: 2023-02-27

## 2023-02-13 RX ORDER — SODIUM CHLORIDE 9 MG/ML
1000 INJECTION, SOLUTION INTRAVENOUS
Refills: 0 | Status: DISCONTINUED | OUTPATIENT
Start: 2023-02-13 | End: 2023-02-27

## 2023-02-13 RX ORDER — IBUPROFEN 200 MG
2 TABLET ORAL
Qty: 0 | Refills: 0 | DISCHARGE

## 2023-02-13 RX ORDER — ACETAMINOPHEN 500 MG
650 TABLET ORAL EVERY 6 HOURS
Refills: 0 | Status: DISCONTINUED | OUTPATIENT
Start: 2023-02-13 | End: 2023-02-27

## 2023-02-13 RX ORDER — SODIUM CHLORIDE 9 MG/ML
1000 INJECTION, SOLUTION INTRAVENOUS
Refills: 0 | Status: DISCONTINUED | OUTPATIENT
Start: 2023-02-13 | End: 2023-02-13

## 2023-02-13 RX ADMIN — HEPARIN SODIUM 5000 UNIT(S): 5000 INJECTION INTRAVENOUS; SUBCUTANEOUS at 22:27

## 2023-02-13 RX ADMIN — HYDROMORPHONE HYDROCHLORIDE 0.5 MILLIGRAM(S): 2 INJECTION INTRAMUSCULAR; INTRAVENOUS; SUBCUTANEOUS at 17:30

## 2023-02-13 RX ADMIN — HYDROMORPHONE HYDROCHLORIDE 0.5 MILLIGRAM(S): 2 INJECTION INTRAMUSCULAR; INTRAVENOUS; SUBCUTANEOUS at 16:45

## 2023-02-13 RX ADMIN — SENNA PLUS 2 TABLET(S): 8.6 TABLET ORAL at 22:27

## 2023-02-13 RX ADMIN — HYDROMORPHONE HYDROCHLORIDE 0.5 MILLIGRAM(S): 2 INJECTION INTRAMUSCULAR; INTRAVENOUS; SUBCUTANEOUS at 15:50

## 2023-02-13 RX ADMIN — Medication 650 MILLIGRAM(S): at 22:29

## 2023-02-13 RX ADMIN — HYDROMORPHONE HYDROCHLORIDE 0.5 MILLIGRAM(S): 2 INJECTION INTRAMUSCULAR; INTRAVENOUS; SUBCUTANEOUS at 16:05

## 2023-02-13 RX ADMIN — HYDROMORPHONE HYDROCHLORIDE 0.5 MILLIGRAM(S): 2 INJECTION INTRAMUSCULAR; INTRAVENOUS; SUBCUTANEOUS at 17:15

## 2023-02-13 RX ADMIN — Medication 600 MILLIGRAM(S): at 19:04

## 2023-02-13 RX ADMIN — Medication 10 MILLIGRAM(S): at 19:37

## 2023-02-13 RX ADMIN — Medication 600 MILLIGRAM(S): at 18:34

## 2023-02-13 RX ADMIN — SODIUM CHLORIDE 125 MILLILITER(S): 9 INJECTION, SOLUTION INTRAVENOUS at 18:49

## 2023-02-13 RX ADMIN — HYDROMORPHONE HYDROCHLORIDE 0.5 MILLIGRAM(S): 2 INJECTION INTRAMUSCULAR; INTRAVENOUS; SUBCUTANEOUS at 16:29

## 2023-02-13 NOTE — PROGRESS NOTE ADULT - ASSESSMENT
A/P: 57y Female s/p R PCNL    -DVT prophylaxis/OOB  -Incentive spirometry  -Strict I&O's  -Analgesia and antiemetics as needed  -Regular Diet  -AM labs  -TOV in AM   -home with Cipro

## 2023-02-13 NOTE — PATIENT PROFILE ADULT - DO YOU FEEL THREATENED BY OTHERS?
[de-identified] : Right elbow AP/lateral/oblique Xrays: Healed/remodeling right supracondylar humerus fracture with a moderate amount of callus formation.  Growth plates are open. The radiocapitellar articulation is normal. The anterior humeral line intersects the capitellum.
no

## 2023-02-13 NOTE — ASU DISCHARGE PLAN (ADULT/PEDIATRIC) - NS MD DC FALL RISK RISK
For information on Fall & Injury Prevention, visit: [email or web address] OR  [email or web address] OR  https://www.cdc.gov/steadi/patient.html

## 2023-02-13 NOTE — BRIEF OPERATIVE NOTE - OPERATION/FINDINGS
Cystoscopy, right PCNL, dilation through existing NT tract.   JJ stent placement, Nephrostomy tube placement.   0:1:75

## 2023-02-13 NOTE — ASU DISCHARGE PLAN (ADULT/PEDIATRIC) - ASU DC SPECIAL INSTRUCTIONSFT
It is common to have blood in the urine after your procedure.  It may be pink or even red; inform your doctor if you have a significant amount of clots in the urine or if you are unable to void at all.  Be sure to drink plenty of fluids at all times.    -It is not uncommon to have some burning when you urinate or to even notice some stone fragments in your urine.  -You have an internal stent (a hollow tube that runs from the kidney to your bladder) after your procedure, helping your kidney drain down to your bladder after your surgery.  Some patients do not notice that they have a stent, while others complain of the sensation of needing to urinate frequently, burning on urination, or even some back pain (especially when they go to urinate). These sensations usually improve gradually, some faster than others. This is not uncommon, but may initially warrant the use of the pain medication which you were prescribed.  While the stent is in place, your urine may continue to be bloody. This stent is temporary and must be removed by your urologist as an outpatient.  -Provided that you are not restricted with fluids by your physician, you should drink 6-8 (8 oz.) glasses of fluid per day.  -You have a temporary nephrostomy tube to help drain the kidney. Please keep this to bag drainage and empty as needed. If the tube clots off and you experience severe pain or are making minimal urine, please call the office or go to the emergency room for evaluation.   -You may resume your regular diet and regular medication regimen.    -You may shower or bathe.    -You may take over the counter pain medications such as Tylenol, do not exceed 4 grams daily.  If you have severe pain that does not improve with the pain medication, a breakthrough medication (Oxycodone) has been sent to your pharmacy.   -You will have a prescription for an antibiotic when you go home.  Take this medication as instructed; if you miss one dose, resume taking it on your previous schedule until you have completed the entire course of treatment.  -As you have just underwent general anesthesia, you should refrain from driving, heavy lifting, smoking, alcohol consumption, or important decision making for the next 24 hours.  You may climb stairs and you may resume sexual activity.  -Call your physician if you have a fever over 101F.  -Make a follow up appointment for with your urologist when you arrive home (or the next business day).  -Call your urologist during normal business hours with any other routine questions.

## 2023-02-13 NOTE — PATIENT PROFILE ADULT - FALL HARM RISK - UNIVERSAL INTERVENTIONS
Bed in lowest position, wheels locked, appropriate side rails in place/Call bell, personal items and telephone in reach/Instruct patient to call for assistance before getting out of bed or chair/Non-slip footwear when patient is out of bed/[place] to call system/Physically safe environment - no spills, clutter or unnecessary equipment/Purposeful Proactive Rounding/Room/bathroom lighting operational, light cord in reach

## 2023-02-13 NOTE — ASU DISCHARGE PLAN (ADULT/PEDIATRIC) - CARE PROVIDER_API CALL
Hoenig, David M (MD)  Urology  [facility]- Dept. of Urology, [address] [place] [address]  Phone: [phone]  Fax: [phone]  Follow Up Time:

## 2023-02-14 ENCOUNTER — TRANSCRIPTION ENCOUNTER (OUTPATIENT)
Age: 58
End: 2023-02-14

## 2023-02-14 VITALS
DIASTOLIC BLOOD PRESSURE: 80 MMHG | RESPIRATION RATE: 18 BRPM | SYSTOLIC BLOOD PRESSURE: 134 MMHG | HEART RATE: 66 BPM | OXYGEN SATURATION: 95 % | TEMPERATURE: 98 F

## 2023-02-14 LAB
ANION GAP SERPL CALC-SCNC: 8 MMOL/L — SIGNIFICANT CHANGE UP (ref 5–17)
BUN SERPL-MCNC: 16 MG/DL — SIGNIFICANT CHANGE UP (ref 7–23)
CALCIUM SERPL-MCNC: 9.2 MG/DL — SIGNIFICANT CHANGE UP (ref 8.4–10.5)
CHLORIDE SERPL-SCNC: 105 MMOL/L — SIGNIFICANT CHANGE UP (ref 96–108)
CO2 SERPL-SCNC: 26 MMOL/L — SIGNIFICANT CHANGE UP (ref 22–31)
CREAT SERPL-MCNC: 0.78 MG/DL — SIGNIFICANT CHANGE UP (ref 0.5–1.3)
EGFR: 90 ML/MIN/1.73M2 — SIGNIFICANT CHANGE UP
GLUCOSE SERPL-MCNC: 105 MG/DL — HIGH (ref 70–99)
HCT VFR BLD CALC: 31.2 % — LOW (ref 34.5–45)
HGB BLD-MCNC: 9.9 G/DL — LOW (ref 11.5–15.5)
MCHC RBC-ENTMCNC: 28.4 PG — SIGNIFICANT CHANGE UP (ref 27–34)
MCHC RBC-ENTMCNC: 31.7 GM/DL — LOW (ref 32–36)
MCV RBC AUTO: 89.7 FL — SIGNIFICANT CHANGE UP (ref 80–100)
NRBC # BLD: 0 /100 WBCS — SIGNIFICANT CHANGE UP (ref 0–0)
PLATELET # BLD AUTO: 179 K/UL — SIGNIFICANT CHANGE UP (ref 150–400)
POTASSIUM SERPL-MCNC: 4.1 MMOL/L — SIGNIFICANT CHANGE UP (ref 3.5–5.3)
POTASSIUM SERPL-SCNC: 4.1 MMOL/L — SIGNIFICANT CHANGE UP (ref 3.5–5.3)
RBC # BLD: 3.48 M/UL — LOW (ref 3.8–5.2)
RBC # FLD: 15.5 % — HIGH (ref 10.3–14.5)
SODIUM SERPL-SCNC: 139 MMOL/L — SIGNIFICANT CHANGE UP (ref 135–145)
WBC # BLD: 9.68 K/UL — SIGNIFICANT CHANGE UP (ref 3.8–10.5)
WBC # FLD AUTO: 9.68 K/UL — SIGNIFICANT CHANGE UP (ref 3.8–10.5)

## 2023-02-14 PROCEDURE — 76000 FLUOROSCOPY <1 HR PHYS/QHP: CPT

## 2023-02-14 PROCEDURE — C1769: CPT

## 2023-02-14 PROCEDURE — 85027 COMPLETE CBC AUTOMATED: CPT

## 2023-02-14 PROCEDURE — 88300 SURGICAL PATH GROSS: CPT

## 2023-02-14 PROCEDURE — C1758: CPT

## 2023-02-14 PROCEDURE — C1729: CPT

## 2023-02-14 PROCEDURE — 80048 BASIC METABOLIC PNL TOTAL CA: CPT

## 2023-02-14 PROCEDURE — C9399: CPT

## 2023-02-14 PROCEDURE — C2625: CPT

## 2023-02-14 PROCEDURE — C1887: CPT

## 2023-02-14 PROCEDURE — 81025 URINE PREGNANCY TEST: CPT

## 2023-02-14 PROCEDURE — C1889: CPT

## 2023-02-14 PROCEDURE — 82365 CALCULUS SPECTROSCOPY: CPT

## 2023-02-14 PROCEDURE — 50081 PERQ NL/PL LITHOTRP CPLX>2CM: CPT | Mod: RT

## 2023-02-14 PROCEDURE — C1726: CPT

## 2023-02-14 RX ORDER — NITROFURANTOIN MACROCRYSTAL 50 MG
100 CAPSULE ORAL
Qty: 0 | Refills: 0 | DISCHARGE

## 2023-02-14 RX ADMIN — Medication 500 MILLIGRAM(S): at 05:12

## 2023-02-14 RX ADMIN — HEPARIN SODIUM 5000 UNIT(S): 5000 INJECTION INTRAVENOUS; SUBCUTANEOUS at 05:13

## 2023-02-14 RX ADMIN — Medication 650 MILLIGRAM(S): at 08:30

## 2023-02-14 RX ADMIN — Medication 650 MILLIGRAM(S): at 00:03

## 2023-02-14 RX ADMIN — Medication 650 MILLIGRAM(S): at 07:40

## 2023-02-14 NOTE — DISCHARGE NOTE NURSING/CASE MANAGEMENT/SOCIAL WORK - NSDCPEFALRISK_GEN_ALL_CORE
For information on Fall & Injury Prevention, visit: [email or web address] OR  [email or web address] OR  https://www.cdc.gov/steadi/patient.html

## 2023-02-14 NOTE — DISCHARGE NOTE PROVIDER - NSDCCPTREATMENT_GEN_ALL_CORE_FT
PRINCIPAL PROCEDURE  Procedure: Cystoscopy with percutaneous nephrolithotomy  Findings and Treatment: right side

## 2023-02-14 NOTE — DISCHARGE NOTE PROVIDER - NSDCCPCAREPLAN_GEN_ALL_CORE_FT
PRINCIPAL DISCHARGE DIAGNOSIS  Diagnosis: Renal stone  Assessment and Plan of Treatment: There is a wound in the back that may leak urine after the nephrostomy tube is removed.  Do not flush the nephrostomy tube  Cover the wound with a clean gauze and tape daily  Take tylenol for pain, senna to prevent constipation, and the antibiotics  Drink plenty of fluids  Follow with Dr Hoenig  within 1 week to have the nephrostomy tube removed  Call the office at [phone] if you have fevers of 101F+ or heavy bleeding in the urine.

## 2023-02-14 NOTE — DISCHARGE NOTE PROVIDER - CARE PROVIDER_API CALL
Hoenig, David M (MD)  Urology  [facility]- Dept. of Urology, [address] [place] [address]  Phone: [phone]  Fax: [phone]  Follow Up Time: 1 week

## 2023-02-14 NOTE — PROGRESS NOTE ADULT - ATTENDING COMMENTS
pt seen and examined  much less pain this AM  feeling well  urine blood tinged, but clearing  agree with plan above- dc lo for gertrudevdenis planning  outpt removal nephrostomy in office later this week

## 2023-02-14 NOTE — DISCHARGE NOTE PROVIDER - HOSPITAL COURSE
This is a 57 year old female who underwent a scheduled right PCNL.  She remained hemodynamically stable throughout her admission and did not require any blood products.  POD#1, her lo catheter was removed and she was able to void without retention.  Her right nephrostomy tube was left in place and she was discharged with it.  Prior to discharge, the patient was pain controlled, ambulating, and tolerating a regular diet.

## 2023-02-14 NOTE — DISCHARGE NOTE PROVIDER - CARE PROVIDERS DIRECT ADDRESSES
[email or web address]

## 2023-02-14 NOTE — DISCHARGE NOTE NURSING/CASE MANAGEMENT/SOCIAL WORK - PATIENT PORTAL LINK FT
You can access the FollowMyHealth Patient Portal offered by [facility] by registering at the following website: [email or web address]. By joining [employer]’s FollowMyHealth portal, you will also be able to view your health information using other applications (apps) compatible with our system.

## 2023-02-14 NOTE — SBIRT NOTE ADULT - NSSBIRTSCREENAVAIL_GEN_A_CORE
Phone call from patient who was having leftt sided chest pain for the past several hours. He has had some SOB as well. No fever or cough. Just does not feel right.  Was urged to go to the ER for more in depth evaluation Yes

## 2023-02-14 NOTE — PROGRESS NOTE ADULT - SUBJECTIVE AND OBJECTIVE BOX
Post op Check    Pt seen and examined without complaints. Pain is controlled. Denies SOB/CP/N/V.     Vital Signs Last 24 Hrs  T(C): 36.5 (13 Feb 2023 15:40), Max: 36.8 (13 Feb 2023 11:03)  T(F): 97.7 (13 Feb 2023 15:40), Max: 98.2 (13 Feb 2023 11:03)  HR: 56 (13 Feb 2023 18:00) (50 - 92)  BP: 123/60 (13 Feb 2023 18:00) (116/58 - 167/87)  BP(mean): 84 (13 Feb 2023 18:00) (83 - 104)  RR: 16 (13 Feb 2023 18:00) (16 - 17)  SpO2: 100% (13 Feb 2023 18:00) (97% - 100%)    Parameters below as of 13 Feb 2023 18:00  Patient On (Oxygen Delivery Method): room air        I&O's Summary    13 Feb 2023 07:01  -  13 Feb 2023 19:13  --------------------------------------------------------  IN: 200 mL / OUT: 0 mL / NET: 200 mL    I&O's Detail    13 Feb 2023 07:01  -  13 Feb 2023 19:13  --------------------------------------------------------  IN:    Lactated Ringers: 200 mL  Total IN: 200 mL    OUT:  Total OUT: 0 mL    Total NET: 200 mL        Physical Exam  Gen: awake alert NAD AXOX3  Pulm: No respiratory distress, no subcostal retractions  CV: RRR, no JVD  Abd: Soft, NT, ND  Back: R flank dressing saturated with blood, changed at bedside, R NT draining translucent red urine with a few clots in tubing   : lo draining dark red urine, flushed with NS without aspiration of clot, urine cleared after 2-3 flushes, now draining clear pink                           11.0   8.39  )-----------( 187      ( 13 Feb 2023 16:31 )             34.6       02-13    142  |  108  |  16  ----------------------------<  117<H>  4.1   |  24  |  0.83    Ca    9.2      13 Feb 2023 16:31          
UROLOGY DAILY PROGRESS NOTE:     Subjective: Patient seen and examined at bedside. No overnight events. Pain controlled. Oconnor removed on AM rounds, was previously draining clear, pink-tinged urine. R NT draining well, clear, wine-colored urine. Incision c/d/i, dressing changed on AM rounds.       Objective:  Vital signs  T(F): , Max: 98.2 (02-13-23 @ 11:03)  HR: 60 (02-14-23 @ 05:39)  BP: 123/63 (02-14-23 @ 05:39)  SpO2: 96% (02-14-23 @ 05:39)  Wt(kg): --    I&O's Summary    13 Feb 2023 07:01  -  14 Feb 2023 07:00  --------------------------------------------------------  IN: 1980 mL / OUT: 900 mL / NET: 1080 mL  Oconnor: 850cc shift  R NT: 50cc shift         Gen: NAD  Pulm: No respiratory distress, no subcostal retractions  Abd: Soft, NT, ND  Back: R NT in place, draining well, clear wine-colored urine, dressing changed at bedside on AM rounds, incision c/d/i  : Oconnor removed     Labs:  02-13  8.39  / 34.6  /0.83                           11.0   8.39  )-----------( 187      ( 13 Feb 2023 16:31 )             34.6     02-13    142  |  108  |  16  ----------------------------<  117<H>  4.1   |  24  |  0.83    Ca    9.2      13 Feb 2023 16:31

## 2023-02-14 NOTE — PROGRESS NOTE ADULT - ASSESSMENT
A/P: 57y Female s/p R PCNL    -DVT prophylaxis/OOB  -Incentive spirometry  -Strict I&O's  -Analgesia and antiemetics as needed  -Regular Diet  -AM labs  -TOV in AM   -home with Cipro   -home with R NT

## 2023-02-14 NOTE — DISCHARGE NOTE PROVIDER - NSDCMRMEDTOKEN_GEN_ALL_CORE_FT
acetaminophen 325 mg oral tablet: 3 tab(s) orally every 6 hours, As needed, Mild Pain (1 - 3)  Cipro 500 mg oral tablet: 1 tab(s) orally every 12 hours   ibuprofen 200 mg oral tablet: 2 tab(s) orally every 6 hours, As Needed  nitrofurantoin macrocrystals 100 mg oral capsule: 100 milligram(s) orally  oxyCODONE 5 mg oral tablet: 1 tab(s) orally every 6 hours MDD:4  senna leaf extract oral tablet: 2 tab(s) orally once a day (at bedtime)

## 2023-02-17 ENCOUNTER — APPOINTMENT (OUTPATIENT)
Dept: UROLOGY | Facility: CLINIC | Age: 58
End: 2023-02-17
Payer: COMMERCIAL

## 2023-02-17 PROCEDURE — 99214 OFFICE O/P EST MOD 30 MIN: CPT | Mod: 24

## 2023-02-17 NOTE — HISTORY OF PRESENT ILLNESS
[FreeTextEntry1] : Pt returns for metabolic evaluation and prevention of future stone disease following recent surgery for stone.  At issue today is review of the stone analysis, risk factors for future stone episodes and establishing whether they have pure dietary, metabolic, or mixed causes for their stone risks which is unrelated to the surgical management of their stone disease.\par \par They underwent right PCNL on 2/13/23 via existing right nephrostomy tract\par \par Stone analysis: pending.\par \par Nephrostomy on right, internal stent on right due to sig hydro, inflammation at stone impaction site right UPJ\par \par \par

## 2023-02-17 NOTE — PHYSICAL EXAM
[General Appearance - Well Developed] : well developed [General Appearance - Well Nourished] : well nourished [Normal Appearance] : normal appearance [Well Groomed] : well groomed [General Appearance - In No Acute Distress] : no acute distress [Abdomen Soft] : soft [Abdomen Tenderness] : non-tender [Costovertebral Angle Tenderness] : no ~M costovertebral angle tenderness [Edema] : no peripheral edema [] : no respiratory distress [Respiration, Rhythm And Depth] : normal respiratory rhythm and effort [Exaggerated Use Of Accessory Muscles For Inspiration] : no accessory muscle use [Oriented To Time, Place, And Person] : oriented to person, place, and time [Affect] : the affect was normal [Not Anxious] : not anxious [Mood] : the mood was normal [Normal Station and Gait] : the gait and station were normal for the patient's age [No Focal Deficits] : no focal deficits [FreeTextEntry1] : right pcnl site healing, nephrostomy removed

## 2023-02-17 NOTE — ASSESSMENT
[FreeTextEntry1] : Nephrostomy removed\par \par for cysto and stent removal in ~1-2 weeks\par \par Diet modification reviewed at length- increasing fluids (primarily water), citrus is good, and decreasing/moderating salt, animal flesh protein, oxalate containing foods, and moderation of calcium intake (1000 mg/day is USRDA).\par \par I reviewed with the patient the risks of metabolic stone disease given their underlying risk parameters (all of which include large stones, multiple stones, bilateral stones, family history, and young age), and the indications for 24 hour urine metabolic assessment.\par \par DASH diet a good overall diet plan to consider and review for general health and stone health.\par \par We also discussed benefits of regular exercise and weight loss as independent risk reducers for stones.\par \par 1. Diet modification as discussed\par 2. 24 hour urine collection x 2 in aprox 1 month\par 3. RTC for cysto and stent removal in 1-2 weeks

## 2023-02-20 LAB — NIDUS STONE QN: SIGNIFICANT CHANGE UP

## 2023-02-27 LAB — SURGICAL PATHOLOGY STUDY: SIGNIFICANT CHANGE UP

## 2023-03-01 ENCOUNTER — OUTPATIENT (OUTPATIENT)
Dept: OUTPATIENT SERVICES | Facility: HOSPITAL | Age: 58
LOS: 1 days | End: 2023-03-01
Payer: COMMERCIAL

## 2023-03-01 ENCOUNTER — APPOINTMENT (OUTPATIENT)
Dept: UROLOGY | Facility: CLINIC | Age: 58
End: 2023-03-01
Payer: COMMERCIAL

## 2023-03-01 DIAGNOSIS — Z98.890 OTHER SPECIFIED POSTPROCEDURAL STATES: Chronic | ICD-10-CM

## 2023-03-01 DIAGNOSIS — Z90.49 ACQUIRED ABSENCE OF OTHER SPECIFIED PARTS OF DIGESTIVE TRACT: Chronic | ICD-10-CM

## 2023-03-01 DIAGNOSIS — R35.0 FREQUENCY OF MICTURITION: ICD-10-CM

## 2023-03-01 PROCEDURE — 52310 CYSTOSCOPY AND TREATMENT: CPT | Mod: 58

## 2023-03-01 PROCEDURE — 52310 CYSTOSCOPY AND TREATMENT: CPT

## 2023-03-02 ENCOUNTER — APPOINTMENT (OUTPATIENT)
Dept: UROLOGY | Facility: CLINIC | Age: 58
End: 2023-03-02

## 2023-03-13 DIAGNOSIS — Z87.442 PERSONAL HISTORY OF URINARY CALCULI: ICD-10-CM

## 2023-03-13 DIAGNOSIS — A41.9 SEPSIS, UNSPECIFIED ORGANISM: ICD-10-CM

## 2023-03-27 ENCOUNTER — APPOINTMENT (OUTPATIENT)
Dept: ORTHOPEDIC SURGERY | Facility: CLINIC | Age: 58
End: 2023-03-27
Payer: COMMERCIAL

## 2023-03-27 DIAGNOSIS — M47.816 SPONDYLOSIS W/OUT MYELOPATHY OR RADICULOPATHY, LUMBAR REGION: ICD-10-CM

## 2023-03-27 DIAGNOSIS — M54.50 LOW BACK PAIN, UNSPECIFIED: ICD-10-CM

## 2023-03-27 PROCEDURE — 99204 OFFICE O/P NEW MOD 45 MIN: CPT

## 2023-03-27 RX ORDER — NAPROXEN 500 MG/1
500 TABLET ORAL
Qty: 60 | Refills: 0 | Status: ACTIVE | COMMUNITY
Start: 2023-03-27 | End: 1900-01-01

## 2023-03-27 NOTE — DISCUSSION/SUMMARY
[Medication Risks Reviewed] : Medication risks reviewed [de-identified] : This is a benign history regarding her back pain.  She has no radiating pain and no neurologic signs or symptoms.  She was able to play pickle ball and tennis afterwards.  I did not think that further x-rays were necessary.  I have recommended rest and moist heat.  She has been started on Naprosyn 500 mg twice a day as a nonsteroidal anti-inflammatory with instructions to continue it for 5 days or so after her symptoms have fully resolved.  She will call if there are problems with the medication or worsening of her symptoms and I will see her for follow-up in 4 weeks on a as needed basis.

## 2023-03-27 NOTE — PHYSICAL EXAM
[de-identified] : She is fully alert and oriented with a normal mood and affect.  She is in no acute distress as a take the history.  She ambulates with a normal gait including tiptoe and heel walking.  There are no cutaneous abnormalities or palpable bony defects of the spine other than a small right lower back incision from her prior nephrostomy.  There is no evidence of shortness of breath or respiratory distress.  There is no paravertebral muscle spasm, sciatic notch tenderness or trochanteric tenderness.  Forward flexion of the spine to 70 degrees is painless.  Her lower extremity neurological examination revealed 2+ symmetrical reflexes.  Motor power is normal to manual testing in all lower extremity groups and sensation is normal to light touch in all dermatomes.  Straight leg raising is negative to 90 degrees in the sitting position bilaterally.  Her hips and her knees have a full and painless range of motion with normal stability.  Vascular examination shows no evidence of significant varicosities and there is no lymphedema.  Her hips and knees have a full and painless range of motion with normal stability. [de-identified] : I reviewed a CT scan of the abdomen and pelvis from January that revealed a large right-sided kidney stone.  Bony reconstructions reveal disc space narrowing with a vacuum disc phenomenon at L4-5 and disc space narrowing at L5-S1 with arthritic spurring at both of those levels.
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
## 2023-05-24 ENCOUNTER — APPOINTMENT (OUTPATIENT)
Dept: UROLOGY | Facility: CLINIC | Age: 58
End: 2023-05-24
Payer: COMMERCIAL

## 2023-05-24 ENCOUNTER — OUTPATIENT (OUTPATIENT)
Dept: OUTPATIENT SERVICES | Facility: HOSPITAL | Age: 58
LOS: 1 days | End: 2023-05-24
Payer: COMMERCIAL

## 2023-05-24 DIAGNOSIS — Z90.49 ACQUIRED ABSENCE OF OTHER SPECIFIED PARTS OF DIGESTIVE TRACT: Chronic | ICD-10-CM

## 2023-05-24 DIAGNOSIS — Z87.442 PERSONAL HISTORY OF URINARY CALCULI: ICD-10-CM

## 2023-05-24 DIAGNOSIS — R35.0 FREQUENCY OF MICTURITION: ICD-10-CM

## 2023-05-24 DIAGNOSIS — Z98.890 OTHER SPECIFIED POSTPROCEDURAL STATES: Chronic | ICD-10-CM

## 2023-05-24 DIAGNOSIS — Z87.448 PERSONAL HISTORY OF OTHER DISEASES OF URINARY SYSTEM: ICD-10-CM

## 2023-05-24 PROCEDURE — 76775 US EXAM ABDO BACK WALL LIM: CPT

## 2023-05-24 PROCEDURE — 99213 OFFICE O/P EST LOW 20 MIN: CPT | Mod: 25

## 2023-05-24 PROCEDURE — 76775 US EXAM ABDO BACK WALL LIM: CPT | Mod: 26

## 2023-05-24 NOTE — PHYSICAL EXAM
[General Appearance - Well Developed] : well developed [General Appearance - Well Nourished] : well nourished [Normal Appearance] : normal appearance [Well Groomed] : well groomed [General Appearance - In No Acute Distress] : no acute distress [Abdomen Soft] : soft [Abdomen Tenderness] : non-tender [Costovertebral Angle Tenderness] : no ~M costovertebral angle tenderness [FreeTextEntry1] : right pcnl site healing, nephrostomy removed [Edema] : no peripheral edema [] : no respiratory distress [Respiration, Rhythm And Depth] : normal respiratory rhythm and effort [Exaggerated Use Of Accessory Muscles For Inspiration] : no accessory muscle use [Oriented To Time, Place, And Person] : oriented to person, place, and time [Affect] : the affect was normal [Mood] : the mood was normal [Not Anxious] : not anxious [Normal Station and Gait] : the gait and station were normal for the patient's age [No Focal Deficits] : no focal deficits

## 2023-05-24 NOTE — ASSESSMENT
[FreeTextEntry1] : Renal US reviewed: no stone, no hydro, no solid renal mass. small cyst right lower pole ~ 1 cm appears simple, no doppler flow. \par \par Has not yet done 24 hour urine, but is working on diet mod\par \par Can review by ttm once complete.\par \par Plan \par \par 1. cont diet mod to eval for metabolic issues\par 2. 24 hour urine\par 3. RTC 6 months with renal US.

## 2023-05-24 NOTE — HISTORY OF PRESENT ILLNESS
[FreeTextEntry1] : Pt returns for metabolic evaluation and prevention of future stone disease following recent surgery for stone.  At issue today is review of the stone analysis, risk factors for future stone episodes and establishing whether they have pure dietary, metabolic, or mixed causes for their stone risks which is unrelated to the surgical management of their stone disease.\par \par They underwent right PCNL on 2/13/23 via existing right nephrostomy tract\par \par Stone analysis:  \par 60% Calcium oxalate monohydrate\par 30% Calcium phosphate (apatite)\par 10% Calcium oxalate dihydrate \par \par Nephrostomy on right, internal stent on right due to sig hydro, inflammation at stone impaction site right UPJ---> now for f/u having resolved.\par \par \par  [None] : no symptoms

## 2023-05-25 DIAGNOSIS — Z87.448 PERSONAL HISTORY OF OTHER DISEASES OF URINARY SYSTEM: ICD-10-CM

## 2023-05-25 DIAGNOSIS — Z87.442 PERSONAL HISTORY OF URINARY CALCULI: ICD-10-CM

## 2023-09-26 NOTE — PRE-OP CHECKLIST - DNR CLARIFICATION FORM COMPLETED
n/a Otezla Pregnancy And Lactation Text: This medication is Pregnancy Category C and it isn't known if it is safe during pregnancy. It is unknown if it is excreted in breast milk.

## 2024-01-03 ENCOUNTER — APPOINTMENT (OUTPATIENT)
Dept: UROLOGY | Facility: CLINIC | Age: 59
End: 2024-01-03
Payer: COMMERCIAL

## 2024-01-03 ENCOUNTER — OUTPATIENT (OUTPATIENT)
Dept: OUTPATIENT SERVICES | Facility: HOSPITAL | Age: 59
LOS: 1 days | End: 2024-01-03
Payer: COMMERCIAL

## 2024-01-03 DIAGNOSIS — Z98.890 OTHER SPECIFIED POSTPROCEDURAL STATES: Chronic | ICD-10-CM

## 2024-01-03 DIAGNOSIS — N39.0 SEPSIS, UNSPECIFIED ORGANISM: ICD-10-CM

## 2024-01-03 DIAGNOSIS — Z90.49 ACQUIRED ABSENCE OF OTHER SPECIFIED PARTS OF DIGESTIVE TRACT: Chronic | ICD-10-CM

## 2024-01-03 DIAGNOSIS — N20.0 CALCULUS OF KIDNEY: ICD-10-CM

## 2024-01-03 DIAGNOSIS — A41.9 SEPSIS, UNSPECIFIED ORGANISM: ICD-10-CM

## 2024-01-03 DIAGNOSIS — R35.0 FREQUENCY OF MICTURITION: ICD-10-CM

## 2024-01-03 DIAGNOSIS — Z93.6 OTHER ARTIFICIAL OPENINGS OF URINARY TRACT STATUS: ICD-10-CM

## 2024-01-03 PROCEDURE — 76770 US EXAM ABDO BACK WALL COMP: CPT | Mod: 26

## 2024-01-03 PROCEDURE — 99213 OFFICE O/P EST LOW 20 MIN: CPT | Mod: 25

## 2024-01-03 PROCEDURE — 76770 US EXAM ABDO BACK WALL COMP: CPT

## 2024-01-03 NOTE — ASSESSMENT
[FreeTextEntry1] : Renal US reviewed: No stone, no hydro. no solid renal mass. Right upper pole caliectasis.  simple cyst visualized in the lower pole of the left kidney measuring 1.8 cm x 1.6 cm x 1.5 cm with no flow. Both kidneys are normal in size and echogenicity. Excellent.  Doing great!  RTC 6 months with renal US can move to yearly after if all cont well, neg for recurrence

## 2024-01-03 NOTE — HISTORY OF PRESENT ILLNESS
[FreeTextEntry1] : Pt returns for metabolic evaluation and prevention of future stone disease following recent surgery for stone.  At issue today is review of the stone analysis, risk factors for future stone episodes and establishing whether they have pure dietary, metabolic, or mixed causes for their stone risks which is unrelated to the surgical management of their stone disease.  They underwent right PCNL on 2/13/23 via existing right nephrostomy tract, after earlier sepsis with uti and hydro.  Stone analysis:   60% Calcium oxalate monohydrate 30% Calcium phosphate (apatite) 10% Calcium oxalate dihydrate      [None] : no symptoms

## 2024-01-04 DIAGNOSIS — N20.0 CALCULUS OF KIDNEY: ICD-10-CM

## 2024-01-04 DIAGNOSIS — A41.9 SEPSIS, UNSPECIFIED ORGANISM: ICD-10-CM

## 2024-07-10 ENCOUNTER — APPOINTMENT (OUTPATIENT)
Dept: UROLOGY | Facility: CLINIC | Age: 59
End: 2024-07-10
Payer: COMMERCIAL

## 2024-07-10 ENCOUNTER — OUTPATIENT (OUTPATIENT)
Dept: OUTPATIENT SERVICES | Facility: HOSPITAL | Age: 59
LOS: 1 days | End: 2024-07-10
Payer: COMMERCIAL

## 2024-07-10 DIAGNOSIS — R35.0 FREQUENCY OF MICTURITION: ICD-10-CM

## 2024-07-10 DIAGNOSIS — Z98.890 OTHER SPECIFIED POSTPROCEDURAL STATES: Chronic | ICD-10-CM

## 2024-07-10 DIAGNOSIS — Z90.49 ACQUIRED ABSENCE OF OTHER SPECIFIED PARTS OF DIGESTIVE TRACT: Chronic | ICD-10-CM

## 2024-07-10 DIAGNOSIS — N20.0 CALCULUS OF KIDNEY: ICD-10-CM

## 2024-07-10 PROCEDURE — 99213 OFFICE O/P EST LOW 20 MIN: CPT | Mod: 25

## 2024-07-10 PROCEDURE — 76770 US EXAM ABDO BACK WALL COMP: CPT | Mod: 26

## 2024-07-10 PROCEDURE — 76770 US EXAM ABDO BACK WALL COMP: CPT

## 2024-07-11 DIAGNOSIS — N20.0 CALCULUS OF KIDNEY: ICD-10-CM

## 2025-01-13 NOTE — H&P PST ADULT - BLOOD AVOIDANCE/RESTRICTIONS, PROFILE
Name: Stacy Bradley  YOB: 1950  Gender: female  MRN: [id number]     CC: BILATERAL Knee Osteoarthritis      PROCEDURE: #2 of 3 Hyaluronic Injection    The patient's name and date of birth were confirmed prior to the injection.  The injection site was also confirmed with the patient prior to the procedure. After discussion of risks and benefits including but not limited to pain, infection, skin discoloration, and injury to blood vessels or nerves the patient verbally consented to proceed with injection of the BILATERAL.  The patient is to restrict their activity for 48 hours.    Radiology Report: US guidance was used to examine the joint, ensure adequate needle placement and to decrease the risk of joint aggravation. The intracondylar notch, retropatellar fat pad, patella tendon, patella and tibia were all visualized. Pre and post injection US still images were obtained and placed in the record. Image were obtained with a Sensorist ultrasound transducer (model [id number]).    Procedure Note: After steriley prepping the BILATERAL knee, it was injected with a 2mL of Euflexxa and the medication was observed going into the intra-articular space via US guidance.    The patient tolerated the procedure without difficulty.    JULIA Arriaga   none

## 2025-04-04 NOTE — PRE PROCEDURE NOTE - PRE PROCEDURE EVALUATION
------------------------------------------------------------  Interventional Radiology Pre-Procedure Note  ------------------------------------------------------------    Indication: 57y Female with obstructing 1.6 cm proximal right ureteral stone and moderate/severe hydronephrosis with leukocytosis, fevers and tachycardia at time of presentation. Plan for right percutaneous nephrostomy tube placement.     Past Medical History:  No pertinent past medical history        Allergies: No Known Allergies      Medications:  cefTRIAXone   IVPB: 100 mL/Hr IV Intermittent (01-20-23 @ 18:42)      Vital Signs:   T(F): 98.3 (19:55), Max: 102.7 (16:25)  HR: 77 (20:24)  BP: 110/63 (20:24)  RR: 16 (20:24)  SpO2: 96% (20:24)    Labs:           10.2  12.48)-----(176     (01-20-23 @ 17:08)         31.7     134 | 99 | 21  --------------------< 132     (01-20-23 @ 17:08)  4.8 | 19 | 1.13       PT: 15.4<H> 01-20-23 @ 17:08  aPTT: 30.0 01-20-23 @ 17:08   INR: 1.34<H> 01-20-23 @ 17:08    Imaging: Right proximal ureter 1.6cm obstructing stone with right moderate/severe hydronephrosis and perinephric fat stranding.    Consent: Risks/benefits/alternatives were explained to patient and informed written consent was obtained.     Procedure Plan: Plan for right percutaneous nephrostomy tube placement.     
no

## 2025-07-16 ENCOUNTER — APPOINTMENT (OUTPATIENT)
Dept: UROLOGY | Facility: CLINIC | Age: 60
End: 2025-07-16

## (undated) DEVICE — WARMING BLANKET UPPER ADULT

## (undated) DEVICE — GLV 7.5 PROTEXIS (WHITE)

## (undated) DEVICE — DRAPE TOWEL BLUE 17" X 24"

## (undated) DEVICE — FOLEY HOLDER STATLOCK 2 WAY ADULT

## (undated) DEVICE — DRSG TEGADERM 6"X8"

## (undated) DEVICE — GOWN TRIMAX LG

## (undated) DEVICE — IRR BULB PATHFINDER + 10"

## (undated) DEVICE — ACMI SELF-SEALING SEAL UP TO 7FR

## (undated) DEVICE — TUBING SUCTION 20FT

## (undated) DEVICE — SOL IRR POUR NS 0.9% 500ML

## (undated) DEVICE — VENODYNE/SCD SLEEVE CALF LARGE

## (undated) DEVICE — SOL IRR BAG NS 0.9% 3000ML

## (undated) DEVICE — ADAPTER CHECK FLO 9FR STERILE

## (undated) DEVICE — TUBING RANGER FLUID IRRIGATION SET DISP

## (undated) DEVICE — POSITIONER FOAM EGG CRATE ULNAR 2PCS (PINK)

## (undated) DEVICE — POSITIONER CUSHION INSERT PRONE VIEW LG

## (undated) DEVICE — PREP BETADINE SPONGE STICKS

## (undated) DEVICE — GLV 8 PROTEXIS (WHITE)

## (undated) DEVICE — Device

## (undated) DEVICE — GLV 7 PROTEXIS (WHITE)

## (undated) DEVICE — BOSTON SCIENTIFC ENCORE 26 INFLATOR

## (undated) DEVICE — SPECIMEN CONTAINER 100ML

## (undated) DEVICE — DRAPE U 47X51" NON STERILE

## (undated) DEVICE — GLV 6.5 PROTEXIS (WHITE)

## (undated) DEVICE — DRAPE NEPHROSCOPY 72X118"

## (undated) DEVICE — NDL 20CM TROCAR